# Patient Record
Sex: FEMALE | Race: WHITE | Employment: FULL TIME | URBAN - METROPOLITAN AREA
[De-identification: names, ages, dates, MRNs, and addresses within clinical notes are randomized per-mention and may not be internally consistent; named-entity substitution may affect disease eponyms.]

---

## 2017-01-27 ENCOUNTER — OFFICE VISIT (OUTPATIENT)
Dept: INTERNAL MEDICINE CLINIC | Facility: CLINIC | Age: 47
End: 2017-01-27

## 2017-01-27 VITALS
RESPIRATION RATE: 16 BRPM | HEIGHT: 61.5 IN | HEART RATE: 78 BPM | SYSTOLIC BLOOD PRESSURE: 122 MMHG | WEIGHT: 141 LBS | BODY MASS INDEX: 26.28 KG/M2 | DIASTOLIC BLOOD PRESSURE: 78 MMHG

## 2017-01-27 DIAGNOSIS — F43.9 STRESS: ICD-10-CM

## 2017-01-27 DIAGNOSIS — E66.9 OBESITY (BMI 30-39.9): ICD-10-CM

## 2017-01-27 DIAGNOSIS — Z51.81 ENCOUNTER FOR THERAPEUTIC DRUG MONITORING: Primary | ICD-10-CM

## 2017-01-27 PROCEDURE — 99213 OFFICE O/P EST LOW 20 MIN: CPT | Performed by: INTERNAL MEDICINE

## 2017-01-27 RX ORDER — PHENTERMINE HYDROCHLORIDE 37.5 MG/1
37.5 TABLET ORAL
Qty: 30 TABLET | Refills: 0 | Status: SHIPPED | OUTPATIENT
Start: 2017-01-27 | End: 2017-02-28

## 2017-01-27 NOTE — PROGRESS NOTES
CC: Patient presents with:  Weight Check: same weight       HPI:   Obesity, doing well on phentermine and contrave. No chest pain.        Current Outpatient Prescriptions:  Phentermine HCl 37.5 MG Oral Tab Take 1 tablet (37.5 mg total) by mouth every 2 tabs bid.      The patient indicates understanding of these issues and agrees to the plan. Return in about 4 weeks (around 2/24/2017).

## 2017-02-28 ENCOUNTER — OFFICE VISIT (OUTPATIENT)
Dept: INTERNAL MEDICINE CLINIC | Facility: CLINIC | Age: 47
End: 2017-02-28

## 2017-02-28 VITALS
WEIGHT: 143 LBS | HEIGHT: 61.5 IN | HEART RATE: 74 BPM | SYSTOLIC BLOOD PRESSURE: 118 MMHG | DIASTOLIC BLOOD PRESSURE: 76 MMHG | RESPIRATION RATE: 16 BRPM | BODY MASS INDEX: 26.65 KG/M2

## 2017-02-28 DIAGNOSIS — Z51.81 ENCOUNTER FOR THERAPEUTIC DRUG MONITORING: Primary | ICD-10-CM

## 2017-02-28 DIAGNOSIS — F43.9 STRESS: ICD-10-CM

## 2017-02-28 DIAGNOSIS — E66.9 OBESITY (BMI 30-39.9): ICD-10-CM

## 2017-02-28 PROCEDURE — 99213 OFFICE O/P EST LOW 20 MIN: CPT | Performed by: INTERNAL MEDICINE

## 2017-02-28 RX ORDER — PHENTERMINE HYDROCHLORIDE 37.5 MG/1
37.5 TABLET ORAL
Qty: 30 TABLET | Refills: 0 | Status: SHIPPED | OUTPATIENT
Start: 2017-02-28 | End: 2017-06-23

## 2017-02-28 NOTE — PROGRESS NOTES
CC: Patient presents with:  Weight Check: up 2 lb       HPI:   Obesity, doing well on phentermine and contrave. Only takes contrave once daily, no chest pain.        Current Outpatient Prescriptions:  Phentermine HCl 37.5 MG Oral Tab Take 1 tablet (37.5 m weeks (around 3/28/2017).

## 2017-03-23 ENCOUNTER — OFFICE VISIT (OUTPATIENT)
Dept: INTERNAL MEDICINE CLINIC | Facility: CLINIC | Age: 47
End: 2017-03-23

## 2017-03-23 VITALS
DIASTOLIC BLOOD PRESSURE: 68 MMHG | HEART RATE: 80 BPM | RESPIRATION RATE: 16 BRPM | WEIGHT: 142 LBS | HEIGHT: 61.5 IN | BODY MASS INDEX: 26.47 KG/M2 | SYSTOLIC BLOOD PRESSURE: 118 MMHG

## 2017-03-23 DIAGNOSIS — F43.9 STRESS: ICD-10-CM

## 2017-03-23 DIAGNOSIS — Z51.81 ENCOUNTER FOR THERAPEUTIC DRUG MONITORING: Primary | ICD-10-CM

## 2017-03-23 DIAGNOSIS — E66.9 OBESITY (BMI 30-39.9): ICD-10-CM

## 2017-03-23 PROCEDURE — 99213 OFFICE O/P EST LOW 20 MIN: CPT | Performed by: INTERNAL MEDICINE

## 2017-03-23 RX ORDER — PHENTERMINE HYDROCHLORIDE 37.5 MG/1
37.5 TABLET ORAL
Qty: 30 TABLET | Refills: 0 | Status: CANCELLED | OUTPATIENT
Start: 2017-03-23

## 2017-03-23 RX ORDER — PHENTERMINE HYDROCHLORIDE 15 MG/1
15 CAPSULE ORAL EVERY MORNING
Qty: 30 CAPSULE | Refills: 2 | Status: SHIPPED | OUTPATIENT
Start: 2017-03-23 | End: 2017-08-15

## 2017-03-23 NOTE — PROGRESS NOTES
CC: Patient presents with:  Weight Check: down 1 lb       HPI:   Obesity, doing well on phentermine and contrave. Still with increased stress.        Current Outpatient Prescriptions:  Phentermine HCl 15 MG Oral Cap Take 1 capsule (15 mg total) by alivia agrees to the plan. Return in about 3 months (around 6/23/2017).

## 2017-04-19 ENCOUNTER — TELEPHONE (OUTPATIENT)
Dept: INTERNAL MEDICINE CLINIC | Facility: CLINIC | Age: 47
End: 2017-04-19

## 2017-05-02 ENCOUNTER — TELEPHONE (OUTPATIENT)
Dept: INTERNAL MEDICINE CLINIC | Facility: CLINIC | Age: 47
End: 2017-05-02

## 2017-05-02 NOTE — TELEPHONE ENCOUNTER
WLC: med changes not made outside of an office visit. Also above message does not state name of medication patient is requesting alternative for so we would not be able to give alternatives.

## 2017-05-02 NOTE — TELEPHONE ENCOUNTER
Spoke with patient and advised her that if [facility] is not wanting to use her coupon card for Contrave she would need to go to a different pharmacy that would use the card or a prior authorization would need be done. Patient verbalized understanding.

## 2017-05-16 ENCOUNTER — TELEPHONE (OUTPATIENT)
Dept: INTERNAL MEDICINE CLINIC | Facility: CLINIC | Age: 47
End: 2017-05-16

## 2017-05-16 NOTE — TELEPHONE ENCOUNTER
Patient stopped VSL because of listed complaints, I explained I had not heard of her complications. She went back on probiotic she use to take and is doing well. I told patient to continue what she is tolerating and keep her f/u with MD to discuss.   Tyra

## 2017-06-23 ENCOUNTER — OFFICE VISIT (OUTPATIENT)
Dept: INTERNAL MEDICINE CLINIC | Facility: CLINIC | Age: 47
End: 2017-06-23

## 2017-06-23 VITALS
DIASTOLIC BLOOD PRESSURE: 76 MMHG | SYSTOLIC BLOOD PRESSURE: 122 MMHG | HEART RATE: 70 BPM | HEIGHT: 61.5 IN | RESPIRATION RATE: 16 BRPM | BODY MASS INDEX: 27.21 KG/M2 | WEIGHT: 146 LBS

## 2017-06-23 DIAGNOSIS — Z51.81 ENCOUNTER FOR THERAPEUTIC DRUG MONITORING: Primary | ICD-10-CM

## 2017-06-23 DIAGNOSIS — E66.9 OBESITY (BMI 30-39.9): ICD-10-CM

## 2017-06-23 DIAGNOSIS — F43.9 STRESS: ICD-10-CM

## 2017-06-23 PROCEDURE — 99213 OFFICE O/P EST LOW 20 MIN: CPT | Performed by: INTERNAL MEDICINE

## 2017-06-23 RX ORDER — PHENTERMINE HYDROCHLORIDE 37.5 MG/1
37.5 TABLET ORAL
Qty: 30 TABLET | Refills: 0 | Status: SHIPPED | OUTPATIENT
Start: 2017-06-23 | End: 2017-08-15

## 2017-06-23 NOTE — PROGRESS NOTES
CC: Patient presents with:  Weight Check: up 4 lb       HPI:   Obesity, doing well on phentermine and contrave. Stress level is ok, f/u with psych therapy.        Current Outpatient Prescriptions:  Phentermine HCl 37.5 MG Oral Tab Take 1 tablet (37.5 Stopped belviq due to headaches. Saw dietician. Stress, doing well with psych therapy.  Will increase phentermine to 37.5mg and refill monthly for 3 months, cont contrave.   Will run labs.        The patient indicates understanding of these issues and agre

## 2017-07-24 NOTE — ADDENDUM NOTE
Encounter addended by: Merleen Denver, MA on: 7/24/2017  4:49 PM<BR>    Actions taken: Letter status changed

## 2017-07-26 ENCOUNTER — TELEPHONE (OUTPATIENT)
Dept: INTERNAL MEDICINE CLINIC | Facility: CLINIC | Age: 47
End: 2017-07-26

## 2017-07-26 NOTE — TELEPHONE ENCOUNTER
Requesting Phentermine   LOV: 6/23/17  RTC: 4 weeks  Last Labs: n/a   Filled: 6/23/17 #30 with 0 refills    Future Appointments  Date Time Provider Sheri Du   8/10/2017 4:45 PM Sherman Ernst MD [address]   8/25/2017 2:15 PM Sherman Ernst

## 2017-07-26 NOTE — TELEPHONE ENCOUNTER
Name of Medication:  Phentermine HCl 37.5 MG Oral Tab 30 tablet 0 6/23/2017    Sig :  Take 1 tablet (37.5 mg total) by mouth every morning before breakfast.     Route:   Oral     Order #:   [id number]           Dose:     How is medication prescribed:

## 2017-07-29 LAB
BUN: 16 MG/DL (ref 7–25)
CALCIUM: 9.9 MG/DL (ref 8.6–10.2)
CARBON DIOXIDE: 30 MMOL/L (ref 20–31)
CHLORIDE: 103 MMOL/L (ref 98–110)
CREATININE: 0.87 MG/DL (ref 0.5–1.1)
EGFR IF AFRICN AM: 93 ML/MIN/1.73M2
EGFR IF NONAFRICN AM: 80 ML/MIN/1.73M2
GLUCOSE: 88 MG/DL (ref 65–99)
POTASSIUM: 4.4 MMOL/L (ref 3.5–5.3)
SODIUM: 142 MMOL/L (ref 135–146)
TSH: 1.69 MIU/L
VITAMIN B12: 912 PG/ML (ref 200–1100)
VITAMIN D, 25-OH, TOTAL: 72 NG/ML (ref 30–100)

## 2017-08-15 ENCOUNTER — LAB ENCOUNTER (OUTPATIENT)
Dept: LAB | Age: 47
End: 2017-08-15
Attending: INTERNAL MEDICINE
Payer: COMMERCIAL

## 2017-08-15 ENCOUNTER — OFFICE VISIT (OUTPATIENT)
Dept: INTERNAL MEDICINE CLINIC | Facility: CLINIC | Age: 47
End: 2017-08-15

## 2017-08-15 VITALS
HEART RATE: 80 BPM | HEIGHT: 61.5 IN | RESPIRATION RATE: 16 BRPM | BODY MASS INDEX: 27.4 KG/M2 | DIASTOLIC BLOOD PRESSURE: 76 MMHG | WEIGHT: 147 LBS | SYSTOLIC BLOOD PRESSURE: 122 MMHG

## 2017-08-15 DIAGNOSIS — E78.00 PURE HYPERCHOLESTEROLEMIA: Primary | ICD-10-CM

## 2017-08-15 DIAGNOSIS — Z51.81 ENCOUNTER FOR THERAPEUTIC DRUG MONITORING: Primary | ICD-10-CM

## 2017-08-15 DIAGNOSIS — R79.89 OTHER SPECIFIED ABNORMAL FINDINGS OF BLOOD CHEMISTRY: ICD-10-CM

## 2017-08-15 DIAGNOSIS — E66.9 OBESITY (BMI 30-39.9): ICD-10-CM

## 2017-08-15 DIAGNOSIS — E78.2 MIXED HYPERLIPIDEMIA: ICD-10-CM

## 2017-08-15 PROBLEM — E78.5 HYPERLIPIDEMIA: Status: ACTIVE | Noted: 2017-08-15

## 2017-08-15 PROCEDURE — 99213 OFFICE O/P EST LOW 20 MIN: CPT | Performed by: INTERNAL MEDICINE

## 2017-08-15 RX ORDER — PHENTERMINE HYDROCHLORIDE 37.5 MG/1
37.5 TABLET ORAL
Qty: 30 TABLET | Refills: 0 | Status: SHIPPED | OUTPATIENT
Start: 2017-08-15 | End: 2017-09-07

## 2017-08-15 NOTE — PROGRESS NOTES
CC: Patient presents with:  Weight Check: up 1 lb       HPI:   Obesity, doing well on phentermine and contrave. No chest pain. Had labs done by pcp.        Current Outpatient Prescriptions:  Phentermine HCl 37.5 MG Oral Tab Take 1 tablet (37.5 mg tot these issues and agrees to the plan. Return in about 4 weeks (around 9/12/2017).

## 2017-09-07 ENCOUNTER — OFFICE VISIT (OUTPATIENT)
Dept: INTERNAL MEDICINE CLINIC | Facility: CLINIC | Age: 47
End: 2017-09-07

## 2017-09-07 VITALS
DIASTOLIC BLOOD PRESSURE: 76 MMHG | RESPIRATION RATE: 16 BRPM | HEIGHT: 61.5 IN | HEART RATE: 78 BPM | WEIGHT: 147 LBS | BODY MASS INDEX: 27.4 KG/M2 | SYSTOLIC BLOOD PRESSURE: 122 MMHG

## 2017-09-07 DIAGNOSIS — E78.2 MIXED HYPERLIPIDEMIA: ICD-10-CM

## 2017-09-07 DIAGNOSIS — Z51.81 ENCOUNTER FOR THERAPEUTIC DRUG MONITORING: Primary | ICD-10-CM

## 2017-09-07 DIAGNOSIS — E66.9 OBESITY (BMI 30-39.9): ICD-10-CM

## 2017-09-07 PROCEDURE — 99213 OFFICE O/P EST LOW 20 MIN: CPT | Performed by: INTERNAL MEDICINE

## 2017-09-07 RX ORDER — BUPROPION HYDROCHLORIDE 150 MG/1
300 TABLET ORAL DAILY
Qty: 60 TABLET | Refills: 5 | Status: SHIPPED | OUTPATIENT
Start: 2017-09-07 | End: 2017-10-30

## 2017-09-07 RX ORDER — PHENTERMINE HYDROCHLORIDE 37.5 MG/1
37.5 TABLET ORAL
Qty: 30 TABLET | Refills: 0 | Status: SHIPPED | OUTPATIENT
Start: 2017-09-07 | End: 2017-09-27

## 2017-09-07 NOTE — PROGRESS NOTES
CC: Patient presents with:  Weight Check: same       HPI:   Obesity, doing well on phentermine and contrave. Can't afford contrave as too expensive.        Current Outpatient Prescriptions:  Phentermine HCl 37.5 MG Oral Tab Take 1 tablet (37.5 mg tot XR.  Will cont phentermine 37.5mg and do monthly for 3 months. 2. High lipids, look better, good apoB, low inflammation, TG's better, cont diet modifications. The patient indicates understanding of these issues and agrees to the plan.   Return in ab

## 2017-09-27 ENCOUNTER — OFFICE VISIT (OUTPATIENT)
Dept: INTERNAL MEDICINE CLINIC | Facility: CLINIC | Age: 47
End: 2017-09-27

## 2017-09-27 VITALS
HEART RATE: 60 BPM | SYSTOLIC BLOOD PRESSURE: 100 MMHG | RESPIRATION RATE: 16 BRPM | WEIGHT: 151 LBS | DIASTOLIC BLOOD PRESSURE: 68 MMHG | HEIGHT: 61.5 IN | BODY MASS INDEX: 28.14 KG/M2

## 2017-09-27 DIAGNOSIS — Z51.81 ENCOUNTER FOR THERAPEUTIC DRUG MONITORING: Primary | ICD-10-CM

## 2017-09-27 DIAGNOSIS — E66.9 OBESITY (BMI 30-39.9): ICD-10-CM

## 2017-09-27 DIAGNOSIS — F43.9 STRESS: ICD-10-CM

## 2017-09-27 PROCEDURE — 99214 OFFICE O/P EST MOD 30 MIN: CPT | Performed by: NURSE PRACTITIONER

## 2017-09-27 RX ORDER — BUPROPION HYDROCHLORIDE 150 MG/1
300 TABLET ORAL DAILY
Qty: 60 TABLET | Refills: 5 | Status: CANCELLED | OUTPATIENT
Start: 2017-09-27

## 2017-09-27 RX ORDER — PHENTERMINE HYDROCHLORIDE 37.5 MG/1
37.5 TABLET ORAL
Qty: 30 TABLET | Refills: 0 | Status: SHIPPED | OUTPATIENT
Start: 2017-09-27 | End: 2017-10-30

## 2017-09-27 NOTE — PATIENT INSTRUCTIONS
Continue making lifestyle changes that focus on good nutrition, regular exercise and stress management. Medication Plan: Continue with phentermine at current dose, increase Wellbutrin XL to 300 mg daily.     Agreed upon goal/s before next office visit in from the internal organs and to your large muscles to prepare for “fight or flight.” However, once the effects of adrenaline wear off, cortisol, known as the “stress hormone,” hangs around and starts signaling the body to replenish your food supply.  Jessie Marcial may burn off some extra calories fidgeting or running around cleaning because we can’t sit still, anxiety can also trigger “emotional eating.” Overeating or eating unhealthy foods in response to stress or as a way to calm down is a very common response.  In laboratory mice, that being “stressed” by exposure to the smell of a predator lead the mice to eat more high-fat food pellets, when given the choice of eating these instead of normal feed.   Less Sleep  Do you ever lie awake at night worrying about paying t also learn to tune into your subjective feelings of hunger or fullness, rather than eating just because it’s a mealtime or because there is food in front of you.  A well-designed study of binge-eaters showed that participating in a Mindful Eating program le

## 2017-09-27 NOTE — PROGRESS NOTES
Azael Phipps is a 40year old female presents today for f/u follow-up on medical weight loss program for the treatment of overweight, obesity, or morbid obesity with associated low vitamin D. Previous patient of Dr. Avery Crocker and [facility] client since 8/2015. no pedal edema.   GI: rotund, soft, +BS, no masses, HSM or tenderness  NEURO/MS: motor and sensory grossly intact  PSYCH: pleasant, cooperative, normal mood and affect    ASSESSMENT AND PLAN:  Encounter for therapeutic drug monitoring  (primary encounter di a busy mom, eating cookies in your car as you shuttle the kids back and forth to a slew of activities. 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  If you recognize yourself in by learning to store fat supplies for the long haul. The unfortunate result for you and me is that when we are chronically stressed by life crises and work-life demands, we are prone to getting an extra layer of “visceral fat” deep in our bellies.  Your bel you eat mindlessly, you will likely eat more, yet feel less satisfied.   Cravings and Fast Food  When we are chronically stressed, we crave “comfort foods,” such as a bag of potato chips or a tub of ice cream. These foods tend to be easy to eat, highly proc Sleep is also a powerful factor influencing weight gain or loss. Lack of sleep may disrupt the functioning of ghrelin and leptin—chemicals that control appetite. We also crave carbs when we are tired or grumpy from lack of sleep.  Finally, not getting our p important goals keeps your hands busy and your mind occupied, so you’re less likely to snack on unhealthy foods.  Writing can give you insight into why you’re feeling so stressed and highlight ways of thinking or expectations of yourself that may be increas

## 2017-10-30 ENCOUNTER — OFFICE VISIT (OUTPATIENT)
Dept: INTERNAL MEDICINE CLINIC | Facility: CLINIC | Age: 47
End: 2017-10-30

## 2017-10-30 VITALS
DIASTOLIC BLOOD PRESSURE: 70 MMHG | HEIGHT: 61.5 IN | SYSTOLIC BLOOD PRESSURE: 100 MMHG | HEART RATE: 60 BPM | BODY MASS INDEX: 28.33 KG/M2 | RESPIRATION RATE: 16 BRPM | WEIGHT: 152 LBS

## 2017-10-30 DIAGNOSIS — Z51.81 ENCOUNTER FOR THERAPEUTIC DRUG MONITORING: Primary | ICD-10-CM

## 2017-10-30 DIAGNOSIS — E66.9 OBESITY (BMI 30-39.9): ICD-10-CM

## 2017-10-30 DIAGNOSIS — F43.9 STRESS: ICD-10-CM

## 2017-10-30 PROCEDURE — 99213 OFFICE O/P EST LOW 20 MIN: CPT | Performed by: NURSE PRACTITIONER

## 2017-10-30 RX ORDER — PHENTERMINE HYDROCHLORIDE 37.5 MG/1
37.5 TABLET ORAL
Qty: 30 TABLET | Refills: 0 | Status: SHIPPED | OUTPATIENT
Start: 2017-10-30 | End: 2018-03-20 | Stop reason: ALTCHOICE

## 2017-10-30 RX ORDER — BUPROPION HYDROCHLORIDE 150 MG/1
150 TABLET ORAL DAILY
Qty: 30 TABLET | Refills: 5 | COMMUNITY
Start: 2017-10-30 | End: 2018-03-20

## 2017-10-30 RX ORDER — METFORMIN HYDROCHLORIDE 500 MG/1
500 TABLET, EXTENDED RELEASE ORAL
Qty: 30 TABLET | Refills: 1 | Status: SHIPPED | OUTPATIENT
Start: 2017-10-30 | End: 2017-11-29

## 2017-10-30 RX ORDER — ATOMOXETINE 60 MG/1
60 CAPSULE ORAL DAILY
Qty: 30 CAPSULE | Refills: 0 | COMMUNITY
Start: 2017-10-30 | End: 2017-11-29

## 2017-10-30 NOTE — PATIENT INSTRUCTIONS
Continue making lifestyle changes that focus on good nutrition, regular exercise and stress management. Medication Plan: Continue with phentermine at current dose. Reduce Wellbutrin XL to 150 mg daily. Add Metformin  mg daily with food.     Agreed week.  · Walk for 5 minutes each time. Second week  · Walk 3 times a week. · Walk for 10 minutes each time. Third week  · Walk 3 times a week. · Walk for 13 minutes each time. Fourth week  · Walk 3 times a week. · Walk for 15 minutes each time.   [facility]

## 2017-10-30 NOTE — PROGRESS NOTES
Noa Pinon is a 40year old female presents today for  follow-up on medical weight loss program for the treatment of overweight, obesity, or morbid obesity.     S:  Current weight Wt Readings from Last 6 Encounters:  10/30/17 : 152 lb  09/27/17 : 151 Prescriptions Disp Refills    Phentermine HCl 37.5 MG Oral Tab 30 tablet 0      Sig: Take 1 tablet (37.5 mg total) by mouth every morning before breakfast.      MetFORMIN HCl  MG Oral Tablet 24 Hr 30 tablet 1      Sig: Take 1 tablet (500 mg total) by can stick with. Choose activities you like. Go slowly, especially when just starting out. Work up to being active 30 minutes on most days. Aim for a total of 150 or more minutes a week. Why be fit?   People who are physically fit:  · Are more alert and pro provider  if you have heart disease, high blood pressure, diabetes, arthritis, asthma, or any other health problem that concerns you. Your provider can help you get started and help you stay safe.    Date Last Reviewed: 8/13/2015  © 5199-8188 [employer]

## 2017-11-29 ENCOUNTER — OFFICE VISIT (OUTPATIENT)
Dept: INTERNAL MEDICINE CLINIC | Facility: CLINIC | Age: 47
End: 2017-11-29

## 2017-11-29 VITALS
SYSTOLIC BLOOD PRESSURE: 90 MMHG | HEART RATE: 68 BPM | RESPIRATION RATE: 16 BRPM | DIASTOLIC BLOOD PRESSURE: 60 MMHG | BODY MASS INDEX: 28.52 KG/M2 | HEIGHT: 61.5 IN | WEIGHT: 153 LBS

## 2017-11-29 DIAGNOSIS — E66.9 OBESITY (BMI 30-39.9): ICD-10-CM

## 2017-11-29 DIAGNOSIS — Z51.81 ENCOUNTER FOR THERAPEUTIC DRUG MONITORING: Primary | ICD-10-CM

## 2017-11-29 PROCEDURE — 99213 OFFICE O/P EST LOW 20 MIN: CPT | Performed by: NURSE PRACTITIONER

## 2017-11-29 RX ORDER — METFORMIN HYDROCHLORIDE 500 MG/1
1000 TABLET, EXTENDED RELEASE ORAL
Qty: 60 TABLET | Refills: 1 | Status: SHIPPED | OUTPATIENT
Start: 2017-11-29 | End: 2018-01-23

## 2017-11-29 RX ORDER — PHENTERMINE HYDROCHLORIDE 37.5 MG/1
37.5 TABLET ORAL
Qty: 30 TABLET | Refills: 0 | Status: CANCELLED | OUTPATIENT
Start: 2017-11-29

## 2017-11-29 RX ORDER — FUROSEMIDE 20 MG/1
20 TABLET ORAL DAILY
COMMUNITY
Start: 2017-11-08 | End: 2019-04-15

## 2017-11-29 RX ORDER — PHENTERMINE HYDROCHLORIDE 15 MG/1
15 CAPSULE ORAL EVERY MORNING
Qty: 30 CAPSULE | Refills: 1 | Status: SHIPPED | OUTPATIENT
Start: 2017-11-29 | End: 2018-01-23

## 2017-11-29 NOTE — PATIENT INSTRUCTIONS
Continue making lifestyle changes that focus on good nutrition, regular exercise and stress management. Medication Plan: Reduce phentermine to 15 mg daily for maintenance. Continue Wellbutrin XL at current dose. Increase Metformin ER to 1000 mg daily.

## 2017-11-29 NOTE — PROGRESS NOTES
Jose Cruz Portillo is a 40year old female presents today for  follow-up on medical weight loss program for the treatment of overweight, obesity, or morbid obesity.     S:  Current weight Wt Readings from Last 6 Encounters:  11/29/17 : 153 lb  10/30/17 : 152 normal S1 and S2 without clicks or gallops, no pedal edema.   GI: rotund, soft, +BS, no masses, HSM or tenderness  NEURO/MS: motor and sensory grossly intact  PSYCH: pleasant, cooperative, normal mood and affect    ASSESSMENT AND PLAN:  Encounter for therap patient. Return in about 2 months (around 1/29/2018) for weight mangement. Patient verbalizes understanding.

## 2017-12-08 ENCOUNTER — HOSPITAL ENCOUNTER (OUTPATIENT)
Age: 47
Discharge: HOME | End: 2017-12-08
Attending: EMERGENCY MEDICINE
Payer: COMMERCIAL

## 2017-12-08 ENCOUNTER — APPOINTMENT (OUTPATIENT)
Dept: GENERAL RADIOLOGY | Age: 47
End: 2017-12-08
Attending: EMERGENCY MEDICINE
Payer: COMMERCIAL

## 2017-12-08 VITALS
RESPIRATION RATE: 18 BRPM | TEMPERATURE: 98 F | OXYGEN SATURATION: 98 % | DIASTOLIC BLOOD PRESSURE: 80 MMHG | SYSTOLIC BLOOD PRESSURE: 116 MMHG | HEART RATE: 94 BPM

## 2017-12-08 DIAGNOSIS — B97.89 VIRAL SINUSITIS: ICD-10-CM

## 2017-12-08 DIAGNOSIS — B34.9 VIRAL SYNDROME: Primary | ICD-10-CM

## 2017-12-08 DIAGNOSIS — J32.9 VIRAL SINUSITIS: ICD-10-CM

## 2017-12-08 PROCEDURE — 71020 XR CHEST PA + LAT CHEST (CPT=71020): CPT | Performed by: EMERGENCY MEDICINE

## 2017-12-08 PROCEDURE — 99204 OFFICE O/P NEW MOD 45 MIN: CPT

## 2017-12-08 PROCEDURE — 99213 OFFICE O/P EST LOW 20 MIN: CPT

## 2017-12-08 RX ORDER — PREDNISONE 20 MG/1
40 TABLET ORAL DAILY
Qty: 10 TABLET | Refills: 0 | Status: SHIPPED | OUTPATIENT
Start: 2017-12-08 | End: 2017-12-13

## 2017-12-09 NOTE — ED INITIAL ASSESSMENT (HPI)
Started with a stomach virus on Sunday. Patient has been having a fever every day since Sunday and complains of sinus pain and pressure today. Draining and coughing that is keeping patient up at night. Productive cough.

## 2017-12-09 NOTE — ED PROVIDER NOTES
Patient presents with:  Sinus Problem    HPI:     Radha Dow is a 40year old female who presents with chief complaint of n/v/d, fever, sinus pressure, cough. Started with n/v/d and fever 6 days ago.   States all 8 in her household had similar stomac CHEST PA + LAT CHEST (CPT=71020)  INDICATIONS:  SINUS PAIN  COMPARISON:  None. TECHNIQUE:  PA and lateral chest radiographs were obtained. PATIENT STATED HISTORY: (As transcribed by Technologist)  Productive cough and fever for five days.     FINDINGS:  N

## 2018-02-20 PROCEDURE — 82607 VITAMIN B-12: CPT | Performed by: INTERNAL MEDICINE

## 2018-09-07 PROCEDURE — 86800 THYROGLOBULIN ANTIBODY: CPT | Performed by: NURSE PRACTITIONER

## 2018-09-07 PROCEDURE — 86376 MICROSOMAL ANTIBODY EACH: CPT | Performed by: NURSE PRACTITIONER

## 2018-09-07 PROCEDURE — 84482 T3 REVERSE: CPT | Performed by: NURSE PRACTITIONER

## 2018-12-29 PROCEDURE — 82533 TOTAL CORTISOL: CPT | Performed by: INTERNAL MEDICINE

## 2018-12-29 PROCEDURE — 36415 COLL VENOUS BLD VENIPUNCTURE: CPT | Performed by: INTERNAL MEDICINE

## 2018-12-29 PROCEDURE — 80299 QUANTITATIVE ASSAY DRUG: CPT | Performed by: INTERNAL MEDICINE

## 2019-04-15 ENCOUNTER — OFFICE VISIT (OUTPATIENT)
Dept: FAMILY MEDICINE CLINIC | Facility: CLINIC | Age: 49
End: 2019-04-15
Payer: COMMERCIAL

## 2019-04-15 VITALS
HEART RATE: 68 BPM | TEMPERATURE: 98 F | DIASTOLIC BLOOD PRESSURE: 72 MMHG | RESPIRATION RATE: 18 BRPM | WEIGHT: 182 LBS | BODY MASS INDEX: 33.49 KG/M2 | HEIGHT: 62 IN | SYSTOLIC BLOOD PRESSURE: 118 MMHG

## 2019-04-15 DIAGNOSIS — E66.9 CLASS 1 OBESITY WITHOUT SERIOUS COMORBIDITY WITH BODY MASS INDEX (BMI) OF 30.0 TO 30.9 IN ADULT, UNSPECIFIED OBESITY TYPE: Primary | ICD-10-CM

## 2019-04-15 DIAGNOSIS — R53.82 CHRONIC FATIGUE: ICD-10-CM

## 2019-04-15 DIAGNOSIS — E78.1 HYPERTRIGLYCERIDEMIA: ICD-10-CM

## 2019-04-15 DIAGNOSIS — E55.9 VITAMIN D DEFICIENCY: ICD-10-CM

## 2019-04-15 DIAGNOSIS — M25.50 ARTHRALGIA, UNSPECIFIED JOINT: ICD-10-CM

## 2019-04-15 PROBLEM — R14.1 FLATULENCE, ERUCTATION AND GAS PAIN: Status: ACTIVE | Noted: 2019-04-15

## 2019-04-15 PROBLEM — R14.3 FLATULENCE, ERUCTATION AND GAS PAIN: Status: ACTIVE | Noted: 2019-04-15

## 2019-04-15 PROBLEM — S06.0X0A CONCUSSION WITHOUT LOSS OF CONSCIOUSNESS: Status: ACTIVE | Noted: 2017-06-16

## 2019-04-15 PROBLEM — R14.2 FLATULENCE, ERUCTATION AND GAS PAIN: Status: ACTIVE | Noted: 2019-04-15

## 2019-04-15 PROCEDURE — 99215 OFFICE O/P EST HI 40 MIN: CPT | Performed by: PHYSICIAN ASSISTANT

## 2019-04-15 NOTE — PROGRESS NOTES
Anibal Hopkins is a 53year old female. Patient presents with:  Weight Loss Gain (metabolic): joint pain      HPI:     Hx of hypothyroid and many food allergies.      Lost 60 lbs a few years ago after eliminating food allergens and cleaning up her diet HENT: Negative for congestion, ear pain, sinus pain and sore throat. Eyes: Negative for blurred vision, discharge and redness. Respiratory: Negative for cough, shortness of breath and wheezing.     Cardiovascular: Negative for chest pain and palpitatio Alcohol/week: 0.6 oz        Types: 1 Shots of liquor per week      Drug use: No      Sexual activity: Not on file    Lifestyle      Physical activity:        Days per week: Not on file        Minutes per session: Not on file      Stress: Not on file Cardiovascular: Normal rate, regular rhythm and normal heart sounds. No murmur heard. Pulmonary/Chest: Effort normal and breath sounds normal.   Abdominal: Soft. Bowel sounds are normal. There is no tenderness. There is no guarding.    Lymphadenopathy: - ASSAY, THYROID STIM HORMONE  - FREE T3 (TRIIODOTHYRONINE)  - FREE T4 (FREE THYROXINE)  - THYROID PEROXIDASE (TPO) AB  - CANDIDA IGG/A/M AB PANEL;  Future  - DEHYDROEPIANDROSTERONE SULFATE  - PREGNENOLONE BY MS/MS, SERUM; Future  - THYROID ANTITHYROGLOBULI Orders Placed This Visit:  Orders Placed This Encounter      CHERYL, Direct Screen [E]      CBC W Differential W Platelet [E]      Hemoglobin A1C (Glycohemoglobin) [E]      Insulin [E]      Lipoprotein (A) [E]      Lipid Panel [E]      Vitamin D, 25-Hydroxy [ PLEASE NOTE: As this is a lab test done by an outside company, these results do not pull into your [facility] lab results online.  A copy of the results typically will be given to you when you follow up to discuss the results in the office unless otherwise spe Restore  A liquid supplement for gut health. This is a carbon, soil-based product that helps to rebuild the tight junctions, or important connections between cells, in the intestines.  These tight junctions get compromised by daily stress, reduced immune fu

## 2019-04-15 NOTE — PATIENT INSTRUCTIONS
[facility] Inflammation Test for Food Sensitivities  This is a Food Sensitivity Test that measures sensitivities to up to 132 different foods, coloring and additives.  The Food Inflammation Test, also known as the FIT Test, was created by Jemima Silva Take 1 capsule or 5000 IU daily. Can find at fruitful Yield or Whole Foods. [place] 3 fatty acids are anti-inflammatory and important for brain and nervous system health, including memory. I recommend taking 2000 mg daily.   Some good brands are:

## 2019-05-10 ENCOUNTER — APPOINTMENT (OUTPATIENT)
Dept: LAB | Facility: REFERENCE LAB | Age: 49
End: 2019-05-10
Attending: PHYSICIAN ASSISTANT
Payer: COMMERCIAL

## 2019-05-10 DIAGNOSIS — E78.1 HYPERTRIGLYCERIDEMIA: ICD-10-CM

## 2019-05-10 DIAGNOSIS — R53.82 CHRONIC FATIGUE: ICD-10-CM

## 2019-05-10 DIAGNOSIS — M25.50 ARTHRALGIA, UNSPECIFIED JOINT: ICD-10-CM

## 2019-05-10 DIAGNOSIS — E66.9 CLASS 1 OBESITY WITHOUT SERIOUS COMORBIDITY WITH BODY MASS INDEX (BMI) OF 30.0 TO 30.9 IN ADULT, UNSPECIFIED OBESITY TYPE: ICD-10-CM

## 2019-05-10 DIAGNOSIS — E55.9 VITAMIN D DEFICIENCY: ICD-10-CM

## 2019-05-10 PROCEDURE — 83090 ASSAY OF HOMOCYSTEINE: CPT | Performed by: PHYSICIAN ASSISTANT

## 2019-05-10 PROCEDURE — 84443 ASSAY THYROID STIM HORMONE: CPT | Performed by: PHYSICIAN ASSISTANT

## 2019-05-10 PROCEDURE — 84439 ASSAY OF FREE THYROXINE: CPT | Performed by: PHYSICIAN ASSISTANT

## 2019-05-10 PROCEDURE — 83036 HEMOGLOBIN GLYCOSYLATED A1C: CPT | Performed by: PHYSICIAN ASSISTANT

## 2019-05-10 PROCEDURE — 86800 THYROGLOBULIN ANTIBODY: CPT | Performed by: PHYSICIAN ASSISTANT

## 2019-05-10 PROCEDURE — 84140 ASSAY OF PREGNENOLONE: CPT | Performed by: PHYSICIAN ASSISTANT

## 2019-05-10 PROCEDURE — 86376 MICROSOMAL ANTIBODY EACH: CPT | Performed by: PHYSICIAN ASSISTANT

## 2019-05-10 PROCEDURE — 86628 CANDIDA ANTIBODY: CPT | Performed by: PHYSICIAN ASSISTANT

## 2019-05-10 PROCEDURE — 83695 ASSAY OF LIPOPROTEIN(A): CPT | Performed by: PHYSICIAN ASSISTANT

## 2019-05-10 PROCEDURE — 36415 COLL VENOUS BLD VENIPUNCTURE: CPT | Performed by: PHYSICIAN ASSISTANT

## 2019-05-10 PROCEDURE — 82627 DEHYDROEPIANDROSTERONE: CPT | Performed by: PHYSICIAN ASSISTANT

## 2019-05-10 PROCEDURE — 86038 ANTINUCLEAR ANTIBODIES: CPT | Performed by: PHYSICIAN ASSISTANT

## 2019-05-10 PROCEDURE — 84481 FREE ASSAY (FT-3): CPT | Performed by: PHYSICIAN ASSISTANT

## 2019-05-16 ENCOUNTER — TELEPHONE (OUTPATIENT)
Dept: FAMILY MEDICINE CLINIC | Facility: CLINIC | Age: 49
End: 2019-05-16

## 2019-05-16 NOTE — PROGRESS NOTES
Tato Beatty,    Your results finally returned. I am still waiting on your food sensitivity testing. The Candida antibody levels were found to be elevated in your system.    Candida is a yeast, or a type of microorganism, that is found in the whole mix of b feel like you are being imprisoned by any diet or lifestyle that you follow. In order to improve this I recommend the following:      Caprylic Acid  Description: NOW® Caprylic Acid is a naturally derived nutrient also known as octanoic acid.  Caprylic www.Eagle [email or web address].Muzeek, www.Fanplayr. Muzeek; Whole Foods or Fruitful Yield  Why: to support hormonal health and balance in the system. Lipoprotein A is elevated. This is a small sticky cholesterol particle that is genetically inherited.  It puts you at higher

## 2019-05-17 ENCOUNTER — TELEPHONE (OUTPATIENT)
Dept: FAMILY MEDICINE CLINIC | Facility: CLINIC | Age: 49
End: 2019-05-17

## 2019-05-17 NOTE — TELEPHONE ENCOUNTER
Called pt to come in to redraw blood for micronutrient testing w no charge . There was a problem with the blood we sent out.

## 2019-07-29 ENCOUNTER — OFFICE VISIT (OUTPATIENT)
Dept: INTEGRATIVE MEDICINE | Facility: CLINIC | Age: 49
End: 2019-07-29
Payer: COMMERCIAL

## 2019-07-29 DIAGNOSIS — Z71.3 NUTRITIONAL COUNSELING: ICD-10-CM

## 2019-07-29 DIAGNOSIS — R63.4 WEIGHT LOSS: ICD-10-CM

## 2019-07-29 PROCEDURE — 97802 MEDICAL NUTRITION INDIV IN: CPT | Performed by: NUTRITIONIST

## 2019-07-29 NOTE — PROGRESS NOTES
Self referred   Did patient complete Nutritional Therapy Questionnaire? NO  Completed KBMO testing and [employer]  Presents w/     Jose Cruz Portillo is a 53year old female presenting for Medical Nutrition Therapy in regards to weight loss.   Getting f consider supporting overall gut health w/ L-glutamine and high quality probiotics. Weight gain can also be associated w/ stress and hormone imbalance. Sleep is also not idea which could be affecting cortisol levels and affecting insulin sensitivity.   Exp

## 2019-08-12 ENCOUNTER — APPOINTMENT (OUTPATIENT)
Dept: GENERAL RADIOLOGY | Age: 49
End: 2019-08-12
Attending: FAMILY MEDICINE
Payer: COMMERCIAL

## 2019-08-12 ENCOUNTER — HOSPITAL ENCOUNTER (OUTPATIENT)
Age: 49
Discharge: HOME | End: 2019-08-12
Attending: FAMILY MEDICINE
Payer: COMMERCIAL

## 2019-08-12 VITALS
HEART RATE: 64 BPM | RESPIRATION RATE: 18 BRPM | HEIGHT: 62 IN | WEIGHT: 193 LBS | OXYGEN SATURATION: 99 % | DIASTOLIC BLOOD PRESSURE: 64 MMHG | BODY MASS INDEX: 35.51 KG/M2 | SYSTOLIC BLOOD PRESSURE: 116 MMHG | TEMPERATURE: 97 F

## 2019-08-12 DIAGNOSIS — R07.81 RIB PAIN ON RIGHT SIDE: Primary | ICD-10-CM

## 2019-08-12 PROCEDURE — 99213 OFFICE O/P EST LOW 20 MIN: CPT

## 2019-08-12 PROCEDURE — 71101 X-RAY EXAM UNILAT RIBS/CHEST: CPT | Performed by: FAMILY MEDICINE

## 2019-08-12 RX ORDER — METAXALONE 800 MG/1
800 TABLET ORAL 3 TIMES DAILY
Qty: 21 TABLET | Refills: 0 | Status: SHIPPED | OUTPATIENT
Start: 2019-08-12 | End: 2019-08-19

## 2019-08-12 NOTE — ED PROVIDER NOTES
Patient Seen in: [address] [facility]    History   Patient presents with:  Trauma (cardiovascular, musculoskeletal)    Stated Complaint: rib pain    HPI    **78-year-old female presents to the immediate care today with a chief complaints src Temporal   SpO2 99 %   O2 Device None (Room air)       Current:/64   Pulse 64   Temp 97.3 °F (36.3 °C) (Temporal)   Resp 18   Ht 157.5 cm (5' 2\")   Wt 87.5 kg   SpO2 99%   BMI 35.30 kg/m²         Physical Exam    General appearance: alert, appea (As transcribed by Technologist)  Patient states she developed sudden pain to right ribs just below her breast after reaching up to take down balloons decorations 4 days ago. States it is painful with movement and palpation.      FINDINGS:   Frontal view ch

## 2019-08-12 NOTE — ED INITIAL ASSESSMENT (HPI)
Pt states after reaching up Thursday sudden pain in right ribs, states under her breast. States she had something similar last year. States it is pain ful with movement and palpation.

## 2019-08-19 ENCOUNTER — OFFICE VISIT (OUTPATIENT)
Dept: INTEGRATIVE MEDICINE | Facility: CLINIC | Age: 49
End: 2019-08-19
Payer: COMMERCIAL

## 2019-08-19 DIAGNOSIS — Z71.3 NUTRITIONAL COUNSELING: ICD-10-CM

## 2019-08-19 PROCEDURE — 97803 MED NUTRITION INDIV SUBSEQ: CPT | Performed by: NUTRITIONIST

## 2019-08-19 NOTE — PATIENT INSTRUCTIONS
The products and items listed below (the “Products”)  and their claims have not been evaluated by the Food and Drug Administration. Dietary products are not intended to treat, prevent, mitigate or cure disease.  Ultimately, you must draw your own conclusi ? Oranges     ? Cantaloupe  ? Dried Dates  ? Pineapple  ? Watermelon  ?  Over ripe bananas     ___________  If our next visit, there is no continued weight loss, we will consider supplementing with L-glutamine and Probiotic - See handout for more informatio

## 2019-08-19 NOTE — PROGRESS NOTES
Self Referred  Did patient complete Nutritional Therapy Questionnaire? NO  Completed KBMO testing and [employer] Me    Terry Graham is a 53year old female presenting for medical nutrition therapy in regards to weight loss.   Since last visit on 7/29/19, pa patient: 30 minutes    Dietary Supplements:   Probiomax Lean by NIMISHA Kaiser  8/19/2019  3:02 PM

## 2019-09-16 ENCOUNTER — OFFICE VISIT (OUTPATIENT)
Dept: INTEGRATIVE MEDICINE | Facility: CLINIC | Age: 49
End: 2019-09-16
Payer: COMMERCIAL

## 2019-09-16 DIAGNOSIS — Z71.3 NUTRITIONAL COUNSELING: ICD-10-CM

## 2019-09-16 PROCEDURE — 97803 MED NUTRITION INDIV SUBSEQ: CPT | Performed by: NUTRITIONIST

## 2019-09-16 NOTE — PROGRESS NOTES
Self Referred  Did patient complete Nutritional Therapy Questionnaire? NO  Completed KBMO testing and 23 [employer] Me    Marina Mead is a 53year old female presenting for medical nutrition therapy in regards to weight loss.   Since last visit on 8/21/19, pa

## 2019-10-11 ENCOUNTER — TELEPHONE (OUTPATIENT)
Dept: INTEGRATIVE MEDICINE | Facility: CLINIC | Age: 49
End: 2019-10-11

## 2019-10-30 ENCOUNTER — OFFICE VISIT (OUTPATIENT)
Dept: SURGERY | Facility: CLINIC | Age: 49
End: 2019-10-30
Payer: COMMERCIAL

## 2019-10-30 VITALS
HEART RATE: 89 BPM | OXYGEN SATURATION: 98 % | HEIGHT: 61.5 IN | BODY MASS INDEX: 35.04 KG/M2 | WEIGHT: 188 LBS | DIASTOLIC BLOOD PRESSURE: 68 MMHG | SYSTOLIC BLOOD PRESSURE: 100 MMHG

## 2019-10-30 DIAGNOSIS — E66.9 OBESITY (BMI 30-39.9): ICD-10-CM

## 2019-10-30 DIAGNOSIS — E55.9 VITAMIN D DEFICIENCY: ICD-10-CM

## 2019-10-30 DIAGNOSIS — E78.1 HYPERTRIGLYCERIDEMIA: ICD-10-CM

## 2019-10-30 DIAGNOSIS — K58.2 IRRITABLE BOWEL SYNDROME WITH BOTH CONSTIPATION AND DIARRHEA: ICD-10-CM

## 2019-10-30 DIAGNOSIS — R53.82 CHRONIC FATIGUE: ICD-10-CM

## 2019-10-30 DIAGNOSIS — Z51.81 ENCOUNTER FOR THERAPEUTIC DRUG MONITORING: Primary | ICD-10-CM

## 2019-10-30 DIAGNOSIS — F41.9 ANXIETY: ICD-10-CM

## 2019-10-30 DIAGNOSIS — F43.9 STRESS: ICD-10-CM

## 2019-10-30 PROCEDURE — 99215 OFFICE O/P EST HI 40 MIN: CPT | Performed by: NURSE PRACTITIONER

## 2019-10-30 RX ORDER — FUROSEMIDE 20 MG/1
TABLET ORAL
COMMUNITY
Start: 2019-10-27

## 2019-10-30 NOTE — PATIENT INSTRUCTIONS
Values: To have a healthy heart, long life, grand kids, spouse, self    Stop melatonin, trial magnesium instead. Listen to [address] for hormone balance.      Mary Anne's peppermint capsules on Amazon    Aim for 25 g of sugar/d

## 2019-11-14 ENCOUNTER — TELEPHONE (OUTPATIENT)
Dept: SURGERY | Facility: CLINIC | Age: 49
End: 2019-11-14

## 2019-11-14 NOTE — TELEPHONE ENCOUNTER
VM left informing patient of administration instructions for medication. Encouraged patient to call back with any further questions.      Thank you,  LUI Herrera

## 2019-11-14 NOTE — TELEPHONE ENCOUNTER
Called patient to inform her that Caterina Rushing was approved by insurance. she would like a call just so she could discuss specifically how you would want for her to take medication.

## 2019-12-12 ENCOUNTER — OFFICE VISIT (OUTPATIENT)
Dept: SURGERY | Facility: CLINIC | Age: 49
End: 2019-12-12
Payer: COMMERCIAL

## 2019-12-12 VITALS
WEIGHT: 182.81 LBS | DIASTOLIC BLOOD PRESSURE: 66 MMHG | BODY MASS INDEX: 34.07 KG/M2 | SYSTOLIC BLOOD PRESSURE: 104 MMHG | HEIGHT: 61.5 IN

## 2019-12-12 DIAGNOSIS — F41.9 ANXIETY: ICD-10-CM

## 2019-12-12 DIAGNOSIS — F43.9 STRESS: ICD-10-CM

## 2019-12-12 DIAGNOSIS — E66.9 OBESITY (BMI 30-39.9): ICD-10-CM

## 2019-12-12 DIAGNOSIS — R53.82 CHRONIC FATIGUE: ICD-10-CM

## 2019-12-12 DIAGNOSIS — E55.9 VITAMIN D DEFICIENCY: ICD-10-CM

## 2019-12-12 DIAGNOSIS — K58.2 IRRITABLE BOWEL SYNDROME WITH BOTH CONSTIPATION AND DIARRHEA: ICD-10-CM

## 2019-12-12 DIAGNOSIS — Z51.81 ENCOUNTER FOR THERAPEUTIC DRUG MONITORING: Primary | ICD-10-CM

## 2019-12-12 DIAGNOSIS — E78.1 HYPERTRIGLYCERIDEMIA: ICD-10-CM

## 2019-12-12 PROCEDURE — 99214 OFFICE O/P EST MOD 30 MIN: CPT | Performed by: NURSE PRACTITIONER

## 2019-12-12 NOTE — PROGRESS NOTES
[address], [address], [address], [address] [place] [address]  Dept: [phone]       Patient:  Kori Lynn  :      1970  MRN:      [id number]    Chief Complaint:  Patient p long life, grand kids, spouse, self     Mom passed from heart disease      Past Medical History: History reviewed. No pertinent past medical history.      Comorbidities:  Back pain-Improvement?  no and FRANCISCO-Improvement?  yes    OBJECTIVE     Vitals: /6 History      Not on file    Social Needs      Financial resource strain: Not on file      Food insecurity:        Worry: Not on file        Inability: Not on file      Transportation needs:        Medical: Not on file        Non-medical: Not on file    Tob Rare    Soda Drinker: No       Or very rare      Sports Drinks:  Yes low sugar Powerade  Juice:  No     Food Journal  · Reviewed and Discussed:       · Patient has a Food Journal?: yes Tracking (work program)   · Patient is reading nutrition labels?   yes positive for headaches and  Hx concussion   Behavioral/Psych: positive for anxiety and stress  Endocrine: negative  All other systems were reviewed and are negative    Physical Exam:   General appearance: alert, appears stated age, cooperative and obese Take 2 tablets by mouth 2 (two) times daily. HYPERTRIGLYCERIDEMIA: Elevated. Recommend dietary changes and lifestyle modifications as discussed below. Monitor.      Lab Results   Component Value Date/Time    CHOLEST 176 09/07/2018 11:28 AM    LDL 92 09    Consider Vyvanse or phentermine in the future for additional support     Has taken phentermine, metformin and topiramate in the past- not useful       She has taken saxenda, did not tolerate medication well with significant diarrhea, possible dehydrat

## 2019-12-12 NOTE — PATIENT INSTRUCTIONS
Stretch nightly (10 minutes); Treadmill during a funny show    Values: To have a healthy heart, long life, grand kids, spouse, self    Stop melatonin, trial magnesium instead.      Listen to [employer] Podcast.     Mary Anne's peppermint capsules on A

## 2019-12-19 ENCOUNTER — TELEPHONE (OUTPATIENT)
Dept: INTEGRATIVE MEDICINE | Facility: CLINIC | Age: 49
End: 2019-12-19

## 2020-01-16 ENCOUNTER — OFFICE VISIT (OUTPATIENT)
Dept: SURGERY | Facility: CLINIC | Age: 50
End: 2020-01-16
Payer: COMMERCIAL

## 2020-01-16 VITALS
WEIGHT: 183 LBS | OXYGEN SATURATION: 96 % | HEART RATE: 79 BPM | DIASTOLIC BLOOD PRESSURE: 66 MMHG | BODY MASS INDEX: 34.11 KG/M2 | SYSTOLIC BLOOD PRESSURE: 108 MMHG | HEIGHT: 61.5 IN

## 2020-01-16 DIAGNOSIS — E78.1 HYPERTRIGLYCERIDEMIA: ICD-10-CM

## 2020-01-16 DIAGNOSIS — E66.9 OBESITY (BMI 30-39.9): ICD-10-CM

## 2020-01-16 DIAGNOSIS — R53.82 CHRONIC FATIGUE: ICD-10-CM

## 2020-01-16 DIAGNOSIS — K58.2 IRRITABLE BOWEL SYNDROME WITH BOTH CONSTIPATION AND DIARRHEA: ICD-10-CM

## 2020-01-16 DIAGNOSIS — F43.9 STRESS: ICD-10-CM

## 2020-01-16 DIAGNOSIS — F41.9 ANXIETY: ICD-10-CM

## 2020-01-16 DIAGNOSIS — E55.9 VITAMIN D DEFICIENCY: ICD-10-CM

## 2020-01-16 DIAGNOSIS — Z51.81 ENCOUNTER FOR THERAPEUTIC DRUG MONITORING: Primary | ICD-10-CM

## 2020-01-16 PROCEDURE — 99214 OFFICE O/P EST MOD 30 MIN: CPT | Performed by: NURSE PRACTITIONER

## 2020-01-16 NOTE — PATIENT INSTRUCTIONS
Whole 30. Stretch nightly (10 minutes); Treadmill during a funny show    Values: To have a healthy heart, long life, grand kids, spouse, self    Stop melatonin, trial magnesium instead.      Listen to [place]'s peppermint ca

## 2020-01-16 NOTE — PROGRESS NOTES
[address], [address], [address], [address] [place] [address]  Dept: [phone]       Patient:  Juan Keenan  :      1970  MRN:      [id number]    Chief Complaint:  Patient p screening: Negative     Barriers: Food allergies, stress      Values: To have a healthy heart, long life, grand kids, spouse, self     Mom passed from heart disease      Past Medical History: History reviewed. No pertinent past medical history.      [place] Worry: Not on file        Inability: Not on file      Transportation needs:        Medical: Not on file        Non-medical: Not on file    Tobacco Use      Smoking status: Never Smoker      Smokeless tobacco: Never Used    Substance and Sexual Activity Journal  · Reviewed and Discussed:       · Patient has a Food Journal?: yes Tracking (work program)   · Patient is reading nutrition labels? yes  · Average Caloric Intake: 800-1200    · Average CHO Intake: < 100   · Is patient exercising?  yes  · Type of e Hx concussion   Behavioral/Psych: positive for anxiety and stress  Endocrine: negative  All other systems were reviewed and are negative    Physical Exam:   General appearance: alert, appears stated age, cooperative and obese   Head: Normocephalic, withou 11:28 AM    HDL 50 09/07/2018 11:28 AM    TRIG 170 (H) 09/07/2018 11:28 AM       BLE Edema: Continue Furosemide daily, consider spironolactone      OBESITY/WEIGHT GAIN:     Discussed starting weight:  188 lbs; BMI 34.95; WC: 35 in  Patient's goal weight: 1 migraine headache, hx concussion      Continue low candida dietary pattern, trial Whole 30 by next visit       Continue MVI, Probiotic, Xymogen L-Glutamine, fish oil; Consider Femquil     Encouraged continue work health program      Continue magnesium

## 2020-03-11 ENCOUNTER — OFFICE VISIT (OUTPATIENT)
Dept: SURGERY | Facility: CLINIC | Age: 50
End: 2020-03-11
Payer: COMMERCIAL

## 2020-03-11 VITALS
HEIGHT: 61.5 IN | DIASTOLIC BLOOD PRESSURE: 62 MMHG | WEIGHT: 188.63 LBS | SYSTOLIC BLOOD PRESSURE: 114 MMHG | OXYGEN SATURATION: 97 % | BODY MASS INDEX: 35.16 KG/M2 | HEART RATE: 83 BPM

## 2020-03-11 DIAGNOSIS — E66.9 OBESITY (BMI 30-39.9): ICD-10-CM

## 2020-03-11 DIAGNOSIS — Z51.81 ENCOUNTER FOR THERAPEUTIC DRUG MONITORING: Primary | ICD-10-CM

## 2020-03-11 DIAGNOSIS — R53.82 CHRONIC FATIGUE: ICD-10-CM

## 2020-03-11 DIAGNOSIS — E55.9 VITAMIN D DEFICIENCY: ICD-10-CM

## 2020-03-11 DIAGNOSIS — F41.9 ANXIETY: ICD-10-CM

## 2020-03-11 DIAGNOSIS — K58.2 IRRITABLE BOWEL SYNDROME WITH BOTH CONSTIPATION AND DIARRHEA: ICD-10-CM

## 2020-03-11 DIAGNOSIS — E78.1 HYPERTRIGLYCERIDEMIA: ICD-10-CM

## 2020-03-11 DIAGNOSIS — F43.9 STRESS: ICD-10-CM

## 2020-03-11 PROCEDURE — 99214 OFFICE O/P EST MOD 30 MIN: CPT | Performed by: NURSE PRACTITIONER

## 2020-03-11 NOTE — PATIENT INSTRUCTIONS
Aim for 40 grams of fiber a day-   1 cup of cooked lentils is 15 grams of fiber   2 tbsp of flaxseed ground is 4 grams of fiber  1/2 cup cooked oatmeal is 4 grams of fiber; add in 1/2 cup raspberries for an additional 4 grams of fiber    Bring in vegetab

## 2020-03-11 NOTE — PROGRESS NOTES
[address], Efrain R[address], [address], [address] [place] [address]  Dept: [phone]       Patient:  America Cuellar  :      1970  MRN:      [id number]    Chief Complaint:  Patient p    Sleep screening: Negative     Barriers: Food allergies, stress      Values: To have a healthy heart, long life, grand kids, spouse, self     Mom passed from heart disease      Past Medical History: History reviewed. No pertinent past medical history. file      Number of children: Not on file      Years of education: Not on file      Highest education level: Not on file    Occupational History      Not on file    Social Needs      Financial resource strain: Not on file      Food insecurity:        Worry eating: -  Portion sizes: -  Binge: BED 7 -  Emotional: +  Depression: PHQ total score: 7  Grazing: -  Sweet tooth: -  Crunchy/salty: +  Etoh: Rare    Soda Drinker: No       Or very rare      Sports Drinks:  Yes low sugar Powerade  Juice:  No     Food Jour diarrhea and Hx IBS   Genitourinary:negative, s/p hysterectomy  Integument/breast: positive for dry skin, occasional eczema hands; hx psoriasis   Hematologic/lymphatic: negative  Musculoskeletal:positive for back pain  Neurological: positive for headaches Dimesylate (VYVANSE) 20 MG Oral Cap; Take 1 capsule (20 mg total) by mouth daily. -     VITAMIN D, 25-HYDROXY  -     VITAMIN B12  -     COMP METABOLIC PANEL (14)  -     Naltrexone-buPROPion HCl ER (CONTRAVE) 8-90 MG Oral Tablet 12 Hr;  Take 2 tablets by mo intensive lifestyle and behavioral modifications at this time for weight loss.     Educated patient on lifestyle modifications: Mediterranean/Whole Food/Plant Strong/Low Glycemic Index diet, moderate alcohol consumption, reduced sodium intake to no more th program      Continue magnesium      Trial ember's peppermint capsules for GI upset      Start counseling with Dr. Kae Hightower follow up with Quique Michael [place] and SUJEY Wolfe as recommended    IF, 10 hours maximum     Continue Hello Fresh    Goal:  I

## 2020-03-12 ENCOUNTER — TELEPHONE (OUTPATIENT)
Dept: SURGERY | Facility: CLINIC | Age: 50
End: 2020-03-12

## 2020-10-06 ENCOUNTER — TELEPHONE (OUTPATIENT)
Dept: INTERNAL MEDICINE CLINIC | Facility: HOSPITAL | Age: 50
End: 2020-10-06

## 2020-10-06 DIAGNOSIS — Z20.822 SUSPECTED COVID-19 VIRUS INFECTION: Primary | ICD-10-CM

## 2020-10-07 ENCOUNTER — LAB ENCOUNTER (OUTPATIENT)
Dept: LAB | Age: 50
End: 2020-10-07
Attending: PREVENTIVE MEDICINE

## 2020-10-07 DIAGNOSIS — Z20.822 SUSPECTED COVID-19 VIRUS INFECTION: ICD-10-CM

## 2020-10-28 DIAGNOSIS — E66.9 OBESITY (BMI 30-39.9): ICD-10-CM

## 2020-10-28 DIAGNOSIS — Z51.81 ENCOUNTER FOR THERAPEUTIC DRUG MONITORING: ICD-10-CM

## 2020-10-28 RX ORDER — NALTREXONE HYDROCHLORIDE AND BUPROPION HYDROCHLORIDE 8; 90 MG/1; MG/1
TABLET, EXTENDED RELEASE ORAL
Qty: 120 TABLET | Refills: 3 | OUTPATIENT
Start: 2020-10-28

## 2020-11-19 ENCOUNTER — TELEMEDICINE (OUTPATIENT)
Dept: SURGERY | Facility: CLINIC | Age: 50
End: 2020-11-19

## 2020-11-19 VITALS — BODY MASS INDEX: 36 KG/M2 | WEIGHT: 191 LBS

## 2020-11-19 DIAGNOSIS — E66.9 OBESITY (BMI 30-39.9): ICD-10-CM

## 2020-11-19 DIAGNOSIS — Z51.81 ENCOUNTER FOR THERAPEUTIC DRUG MONITORING: Primary | ICD-10-CM

## 2020-11-19 PROCEDURE — 99214 OFFICE O/P EST MOD 30 MIN: CPT | Performed by: NURSE PRACTITIONER

## 2020-11-19 RX ORDER — PHENTERMINE HYDROCHLORIDE 37.5 MG/1
37.5 CAPSULE ORAL EVERY MORNING
Qty: 30 CAPSULE | Refills: 2 | Status: SHIPPED | OUTPATIENT
Start: 2020-11-19 | End: 2021-02-22

## 2020-11-19 NOTE — PROGRESS NOTES
Virtual Video/Telephone Check-In    Shayla Diamond verbally consents to a Virtual/Telephone Check-In visit on 11/19/20. Patient has been referred to the [facility] website at [email or web address] to review the yearly Consent to Treat document.     Patient of 1 to 10, patient's belief that she can lose weight is a 10.     Patient's goal weight: 130 lb  Biggest weight loss in the past: 60 lb  How weight loss was achieved:   Heaviest weight ever: 196 lbs  Previous use of medical weight loss medications: Charles Social History:  Social History    Socioeconomic History      Marital status:       Spouse name: Not on file      Number of children: Not on file      Years of education: Not on file      Highest education level: Not on file    Occupational Hist Intake:  After dinner behavior: peanut butter, homemade guacamole, pork rinds, snap peas, corn chips  Night eating: -  Portion sizes: -  Binge: BED 7 -  Emotional: +  Depression: PHQ total score: 7  Grazing: -  Sweet tooth: -  Crunchy/salty: +  Etoh: Rare Genitourinary:negative, s/p hysterectomy  Integument/breast: positive for dry skin, occasional eczema hands; hx psoriasis   Hematologic/lymphatic: negative  Musculoskeletal:positive for back pain  Neurological: positive for headaches and  Hx concussion GAIN:     Discussed starting weight:  188 lbs; BMI 34.95; WC: 35 in  Patient's goal weight: 130 lbs  Values:  To have a healthy heart, long life, grand kids, spouse, self     Recommended patient continue intensive lifestyle and behavioral modifications at t low candida dietary pattern; Continue [employer].     Continue MVI, Calcium/Vitamin D, Probiotic, Xymogen L-Glutamine, Fish oil, Magnesium.       Start counseling with Dr. Lane Mayberry.     Continue follow up with Lucho Verduzco [place] for nutrition counseling as recommended.

## 2020-11-19 NOTE — PATIENT INSTRUCTIONS
Continue [employer]. Restart phentermine at the full dose. Plan to restart Contrave at follow up in 2 months.

## 2021-02-19 DIAGNOSIS — E66.9 OBESITY (BMI 30-39.9): ICD-10-CM

## 2021-02-19 DIAGNOSIS — Z51.81 ENCOUNTER FOR THERAPEUTIC DRUG MONITORING: ICD-10-CM

## 2021-02-22 RX ORDER — PHENTERMINE HYDROCHLORIDE 37.5 MG/1
37.5 CAPSULE ORAL EVERY MORNING
Qty: 15 CAPSULE | Refills: 0 | Status: SHIPPED | OUTPATIENT
Start: 2021-02-22 | End: 2021-03-03

## 2021-02-23 NOTE — TELEPHONE ENCOUNTER
Please schedule patient for f/u appointment at this time. I am unable to refill medication further without an updated weight and vital signs. Thank you.

## 2021-03-03 ENCOUNTER — OFFICE VISIT (OUTPATIENT)
Dept: SURGERY | Facility: CLINIC | Age: 51
End: 2021-03-03
Payer: COMMERCIAL

## 2021-03-03 VITALS
WEIGHT: 182.38 LBS | DIASTOLIC BLOOD PRESSURE: 76 MMHG | BODY MASS INDEX: 33.99 KG/M2 | SYSTOLIC BLOOD PRESSURE: 114 MMHG | HEIGHT: 61.5 IN

## 2021-03-03 DIAGNOSIS — E66.9 OBESITY (BMI 30-39.9): ICD-10-CM

## 2021-03-03 DIAGNOSIS — Z51.81 ENCOUNTER FOR THERAPEUTIC DRUG MONITORING: Primary | ICD-10-CM

## 2021-03-03 DIAGNOSIS — E55.9 VITAMIN D DEFICIENCY: ICD-10-CM

## 2021-03-03 DIAGNOSIS — E78.1 HYPERTRIGLYCERIDEMIA: ICD-10-CM

## 2021-03-03 DIAGNOSIS — K58.2 IRRITABLE BOWEL SYNDROME WITH BOTH CONSTIPATION AND DIARRHEA: ICD-10-CM

## 2021-03-03 DIAGNOSIS — R53.82 CHRONIC FATIGUE: ICD-10-CM

## 2021-03-03 DIAGNOSIS — F43.9 STRESS: ICD-10-CM

## 2021-03-03 DIAGNOSIS — F41.9 ANXIETY: ICD-10-CM

## 2021-03-03 PROCEDURE — 99214 OFFICE O/P EST MOD 30 MIN: CPT | Performed by: NURSE PRACTITIONER

## 2021-03-03 PROCEDURE — 3074F SYST BP LT 130 MM HG: CPT | Performed by: NURSE PRACTITIONER

## 2021-03-03 PROCEDURE — 3008F BODY MASS INDEX DOCD: CPT | Performed by: NURSE PRACTITIONER

## 2021-03-03 PROCEDURE — 3078F DIAST BP <80 MM HG: CPT | Performed by: NURSE PRACTITIONER

## 2021-03-03 RX ORDER — PHENTERMINE HYDROCHLORIDE 37.5 MG/1
37.5 CAPSULE ORAL EVERY MORNING
Qty: 30 CAPSULE | Refills: 2 | Status: SHIPPED | OUTPATIENT
Start: 2021-03-03 | End: 2021-04-07

## 2021-03-03 RX ORDER — NALTREXONE HYDROCHLORIDE AND BUPROPION HYDROCHLORIDE 8; 90 MG/1; MG/1
TABLET, EXTENDED RELEASE ORAL
Qty: 120 TABLET | Refills: 0 | Status: SHIPPED | OUTPATIENT
Start: 2021-03-03 | End: 2021-12-08

## 2021-03-03 NOTE — PROGRESS NOTES
Patient:  Adenike Gamez  :      1970  MRN:      [id number]    Chief Complaint:  Weight Management     SUBJECTIVE     History of Present Illness:  Louis  is being seen today for a follow-up for non surgical weight loss.        Continues o Ht 5' 1.5\" (1.562 m)   Wt 182 lb 6.4 oz (82.7 kg)   BMI 33.91 kg/m²     Initial weight loss: -09   Total weight loss: -06   Start weight: 188    Wt Readings from Last 6 Encounters:  03/03/21 : 182 lb 6.4 oz (82.7 kg)  11/19/20 : 191 lb (86.6 kg)  03/11/ Alcohol/week: 1.0 standard drinks        Types: 1 Shots of liquor per week      Drug use: No      Sexual activity: Not on file    Lifestyle      Physical activity        Days per week: Not on file        Minutes per session: Not on file      Stress: [facility] virtual work outs    [address]  · Patient states the following:  · Eats 3 meal(s) per day  · Length of time it takes to consume a meal:    · # of snacks per day: 2   · Type of snacks:  Nuts, fruit  · Amount of soda consu supple, symmetrical, trachea midline and thyroid not enlarged, symmetric, no tenderness/mass/nodules  Lungs: clear to auscultation bilaterally  Heart: S1, S2 normal, no murmur, click, rub or gallop, regular rate and rhythm  Abdomen: soft, non-tender; bowel total) by mouth every morning.  -     EKG 12-LEAD;  Future  -     Naltrexone-buPROPion HCl ER (CONTRAVE) 8-90 MG Oral Tablet 12 Hr; Week 1: 1 tablet in AM. Week 2: 1 tablet in AM and PM. Week 3: 2 tablets in AM, 1 tablet in PM. Week 4: [place] 2 tablets in A 11:28 AM    LDL 92 09/07/2018 11:28 AM    HDL 50 09/07/2018 11:28 AM    TRIG 170 (H) 09/07/2018 11:28 AM       BLE Edema: Continue Furosemide daily, consider spironolactone.      OBESITY/WEIGHT GAIN:     Discussed starting weight:  188 lbs; BMI 34.95; WC: possible dehydration.     Belviq caused severe migraine headache, hx concussion, off market. Vyvanse- not covered.      Needs EKG or faxed copy of EKG to office.      Continue low candida dietary pattern; Continue WW.     Continue MVI, Calcium/Vitamin D,

## 2021-03-26 ENCOUNTER — LAB ENCOUNTER (OUTPATIENT)
Dept: LAB | Age: 51
End: 2021-03-26
Attending: NURSE PRACTITIONER
Payer: COMMERCIAL

## 2021-03-26 DIAGNOSIS — Z51.81 ENCOUNTER FOR THERAPEUTIC DRUG MONITORING: ICD-10-CM

## 2021-03-26 DIAGNOSIS — K58.2 IRRITABLE BOWEL SYNDROME WITH BOTH CONSTIPATION AND DIARRHEA: ICD-10-CM

## 2021-03-26 DIAGNOSIS — F41.9 ANXIETY: ICD-10-CM

## 2021-03-26 DIAGNOSIS — E78.1 HYPERTRIGLYCERIDEMIA: ICD-10-CM

## 2021-03-26 DIAGNOSIS — R53.82 CHRONIC FATIGUE: ICD-10-CM

## 2021-03-26 DIAGNOSIS — E55.9 VITAMIN D DEFICIENCY: ICD-10-CM

## 2021-03-26 DIAGNOSIS — F43.9 STRESS: ICD-10-CM

## 2021-03-26 DIAGNOSIS — E66.9 OBESITY (BMI 30-39.9): ICD-10-CM

## 2021-03-26 LAB
ALBUMIN SERPL-MCNC: 3.8 G/DL (ref 3.4–5)
ALBUMIN/GLOB SERPL: 1.2 {RATIO} (ref 1–2)
ALP LIVER SERPL-CCNC: 99 U/L
ALT SERPL-CCNC: 64 U/L
ANION GAP SERPL CALC-SCNC: 3 MMOL/L (ref 0–18)
AST SERPL-CCNC: 41 U/L (ref 15–37)
BILIRUB SERPL-MCNC: 0.4 MG/DL (ref 0.1–2)
BUN BLD-MCNC: 13 MG/DL (ref 7–18)
BUN/CREAT SERPL: 17.3 (ref 10–20)
CALCIUM BLD-MCNC: 9.3 MG/DL (ref 8.5–10.1)
CHLORIDE SERPL-SCNC: 111 MMOL/L (ref 98–112)
CHOLEST SMN-MCNC: 167 MG/DL (ref ?–200)
CO2 SERPL-SCNC: 28 MMOL/L (ref 21–32)
CREAT BLD-MCNC: 0.75 MG/DL
CRP SERPL-MCNC: 0.34 MG/DL (ref ?–0.3)
EST. AVERAGE GLUCOSE BLD GHB EST-MCNC: 117 MG/DL (ref 68–126)
GLOBULIN PLAS-MCNC: 3.3 G/DL (ref 2.8–4.4)
GLUCOSE BLD-MCNC: 96 MG/DL (ref 70–99)
HBA1C MFR BLD HPLC: 5.7 % (ref ?–5.7)
HDLC SERPL-MCNC: 45 MG/DL (ref 40–59)
LDLC SERPL CALC-MCNC: 93 MG/DL (ref ?–100)
M PROTEIN MFR SERPL ELPH: 7.1 G/DL (ref 6.4–8.2)
NONHDLC SERPL-MCNC: 122 MG/DL (ref ?–130)
OSMOLALITY SERPL CALC.SUM OF ELEC: 294 MOSM/KG (ref 275–295)
PATIENT FASTING Y/N/NP: YES
PATIENT FASTING Y/N/NP: YES
POTASSIUM SERPL-SCNC: 4.3 MMOL/L (ref 3.5–5.1)
SODIUM SERPL-SCNC: 142 MMOL/L (ref 136–145)
T4 FREE SERPL-MCNC: 1 NG/DL (ref 0.8–1.7)
TRIGL SERPL-MCNC: 146 MG/DL (ref 30–149)
TSI SER-ACNC: 1.38 MIU/ML (ref 0.36–3.74)
VIT B12 SERPL-MCNC: 806 PG/ML (ref 193–986)
VIT D+METAB SERPL-MCNC: 74.5 NG/ML (ref 30–100)
VLDLC SERPL CALC-MCNC: 29 MG/DL (ref 0–30)

## 2021-03-26 PROCEDURE — 36415 COLL VENOUS BLD VENIPUNCTURE: CPT | Performed by: NURSE PRACTITIONER

## 2021-03-26 PROCEDURE — 83036 HEMOGLOBIN GLYCOSYLATED A1C: CPT | Performed by: NURSE PRACTITIONER

## 2021-03-26 PROCEDURE — 82306 VITAMIN D 25 HYDROXY: CPT | Performed by: NURSE PRACTITIONER

## 2021-03-26 PROCEDURE — 84443 ASSAY THYROID STIM HORMONE: CPT | Performed by: NURSE PRACTITIONER

## 2021-03-26 PROCEDURE — 84439 ASSAY OF FREE THYROXINE: CPT | Performed by: NURSE PRACTITIONER

## 2021-03-26 PROCEDURE — 86140 C-REACTIVE PROTEIN: CPT | Performed by: NURSE PRACTITIONER

## 2021-03-26 PROCEDURE — 82607 VITAMIN B-12: CPT | Performed by: NURSE PRACTITIONER

## 2021-03-26 PROCEDURE — 80053 COMPREHEN METABOLIC PANEL: CPT | Performed by: NURSE PRACTITIONER

## 2021-03-26 PROCEDURE — 80061 LIPID PANEL: CPT | Performed by: NURSE PRACTITIONER

## 2021-04-07 ENCOUNTER — TELEMEDICINE (OUTPATIENT)
Dept: SURGERY | Facility: CLINIC | Age: 51
End: 2021-04-07

## 2021-04-07 VITALS — BODY MASS INDEX: 33 KG/M2 | WEIGHT: 176.13 LBS

## 2021-04-07 DIAGNOSIS — E55.9 VITAMIN D DEFICIENCY: ICD-10-CM

## 2021-04-07 DIAGNOSIS — Z51.81 ENCOUNTER FOR THERAPEUTIC DRUG MONITORING: Primary | ICD-10-CM

## 2021-04-07 DIAGNOSIS — R79.89 ELEVATED LFTS: ICD-10-CM

## 2021-04-07 DIAGNOSIS — F41.9 ANXIETY: ICD-10-CM

## 2021-04-07 DIAGNOSIS — E66.9 OBESITY (BMI 30-39.9): ICD-10-CM

## 2021-04-07 DIAGNOSIS — K58.2 IRRITABLE BOWEL SYNDROME WITH BOTH CONSTIPATION AND DIARRHEA: ICD-10-CM

## 2021-04-07 DIAGNOSIS — R73.03 PREDIABETES: ICD-10-CM

## 2021-04-07 DIAGNOSIS — R53.82 CHRONIC FATIGUE: ICD-10-CM

## 2021-04-07 DIAGNOSIS — E78.1 HYPERTRIGLYCERIDEMIA: ICD-10-CM

## 2021-04-07 DIAGNOSIS — F43.9 STRESS: ICD-10-CM

## 2021-04-07 PROCEDURE — 99214 OFFICE O/P EST MOD 30 MIN: CPT | Performed by: NURSE PRACTITIONER

## 2021-04-07 RX ORDER — PHENTERMINE HYDROCHLORIDE 37.5 MG/1
37.5 CAPSULE ORAL EVERY MORNING
Qty: 30 CAPSULE | Refills: 2 | Status: SHIPPED | OUTPATIENT
Start: 2021-04-07 | End: 2021-07-14

## 2021-04-07 NOTE — PROGRESS NOTES
Virtual Video & Audio Check-In    Sarahi Lynn verbally consents to a [employer] Check-In visit on 04/07/21. Patient has been referred to the [facility] website at [email or web address] to review the yearly Consent to Treat document.     Mami 10.     Patient's goal weight: 130 lb  Biggest weight loss in the past: 60 lb  How weight loss was achieved:   Heaviest weight ever: 196 lbs  Previous use of medical weight loss medications: Improved diet, exercise, medications for weight loss.      Physic Tramadol     Social History:  Social History    Socioeconomic History      Marital status:       Spouse name: Not on file      Number of children: Not on file      Years of education: Not on file      Highest education level: Not on file    [address] [place] [address] history.     Initial Intake:  After dinner behavior: peanut butter, homemade guacamole, pork rinds, snap peas, corn chips  Night eating: -  Portion sizes: -  Binge: BED 7 -  Emotional: +  Depression: PHQ total score: 7  Grazing: -  Sweet tooth: -  Crunchy/s occasional palpitations  Gastrointestinal: positive for constipation, diarrhea and Hx IBS   Genitourinary:negative, s/p hysterectomy  Integument/breast: positive for dry skin, occasional eczema hands; hx psoriasis   Hematologic/lymphatic: negative  Musculo Component Value Date/Time    CHOLEST 167 03/26/2021 08:52 AM    LDL 93 03/26/2021 08:52 AM    HDL 45 03/26/2021 08:52 AM    TRIG 146 03/26/2021 08:52 AM    VLDL 29 03/26/2021 08:52 AM       BLE Edema: Continue Furosemide daily, consider spironolactone. 11/2020). Restart Contrave after repeat LFTs. Discussed risks, benefits, rationale, and side effects of medication(s). Patient states understanding. All questions answered.      Avoid metformin and topiramate- not useful.       Avoid Saxenda, did not

## 2021-05-12 ENCOUNTER — OFFICE VISIT (OUTPATIENT)
Dept: SURGERY | Facility: CLINIC | Age: 51
End: 2021-05-12
Payer: COMMERCIAL

## 2021-05-12 VITALS
HEART RATE: 95 BPM | HEIGHT: 61.5 IN | SYSTOLIC BLOOD PRESSURE: 100 MMHG | BODY MASS INDEX: 31.99 KG/M2 | DIASTOLIC BLOOD PRESSURE: 72 MMHG | OXYGEN SATURATION: 96 % | WEIGHT: 171.63 LBS

## 2021-05-12 DIAGNOSIS — R79.89 ELEVATED LFTS: ICD-10-CM

## 2021-05-12 DIAGNOSIS — R53.82 CHRONIC FATIGUE: ICD-10-CM

## 2021-05-12 DIAGNOSIS — F41.9 ANXIETY: ICD-10-CM

## 2021-05-12 DIAGNOSIS — E66.9 OBESITY (BMI 30-39.9): ICD-10-CM

## 2021-05-12 DIAGNOSIS — Z51.81 ENCOUNTER FOR THERAPEUTIC DRUG MONITORING: Primary | ICD-10-CM

## 2021-05-12 DIAGNOSIS — K58.2 IRRITABLE BOWEL SYNDROME WITH BOTH CONSTIPATION AND DIARRHEA: ICD-10-CM

## 2021-05-12 DIAGNOSIS — R73.03 PREDIABETES: ICD-10-CM

## 2021-05-12 DIAGNOSIS — E55.9 VITAMIN D DEFICIENCY: ICD-10-CM

## 2021-05-12 DIAGNOSIS — E78.1 HYPERTRIGLYCERIDEMIA: ICD-10-CM

## 2021-05-12 DIAGNOSIS — F43.9 STRESS: ICD-10-CM

## 2021-05-12 PROCEDURE — 3078F DIAST BP <80 MM HG: CPT | Performed by: NURSE PRACTITIONER

## 2021-05-12 PROCEDURE — 3008F BODY MASS INDEX DOCD: CPT | Performed by: NURSE PRACTITIONER

## 2021-05-12 PROCEDURE — 99214 OFFICE O/P EST MOD 30 MIN: CPT | Performed by: NURSE PRACTITIONER

## 2021-05-12 PROCEDURE — 3074F SYST BP LT 130 MM HG: CPT | Performed by: NURSE PRACTITIONER

## 2021-05-12 RX ORDER — AMOXICILLIN AND CLAVULANATE POTASSIUM 875; 125 MG/1; MG/1
875 TABLET, FILM COATED ORAL
COMMUNITY
Start: 2021-05-07 | End: 2021-05-17

## 2021-05-12 RX ORDER — FLUTICASONE PROPIONATE 50 MCG
2 SPRAY, SUSPENSION (ML) NASAL NIGHTLY
COMMUNITY
Start: 2021-05-07

## 2021-05-12 NOTE — PROGRESS NOTES
Patient:  Adenike Gamez  :      1970  MRN:      [id number]    Chief Complaint:  Weight Management     SUBJECTIVE     History of Present Illness:  Louis  is being seen today for a follow-up for non surgical weight loss. Doing well. History reviewed. No pertinent past medical history.      Comorbidities:  Back pain-Improvement?  no and FRANCISCO-Improvement?  yes    OBJECTIVE     Vitals: /72 (BP Location: Right arm, Patient Position: Sitting)   Pulse 95   Ht 5' 1.5\" (1.562 m)   Wt 171 Spouse name: Not on file      Number of children: Not on file      Years of education: Not on file      Highest education level: Not on file    Occupational History      Not on file    Tobacco Use      Smoking status: Never Smoker      Smokeless tobacco: N peas, corn chips  Night eating: -  Portion sizes: -  Binge: BED 7 -  Emotional: +  Depression: PHQ total score: 7  Grazing: -  Sweet tooth: -  Crunchy/salty: +  Etoh: Rare    Soda Drinker: No       Or very rare      Sports Drinks:  Yes low sugar Powerade hysterectomy  Integument/breast: positive for dry skin, occasional eczema hands; hx psoriasis   Hematologic/lymphatic: negative  Musculoskeletal:positive for back pain  Neurological: positive for headaches and  Hx concussion   Behavioral/Psych: positive fo LFTs    Prediabetes      HYPERTRIGLYCERIDEMIA: Elevated. Recommend dietary changes and lifestyle modifications as discussed below. Monitor.      Lab Results   Component Value Date/Time    CHOLEST 167 03/26/2021 08:52 AM    LDL 93 03/26/2021 08:52 AM    HDL Reviewed medication options for weight loss in detail with patient.     Continue phentermine 37.5 mg in the AM (Restart date: 11/2020). Restart Contrave after repeat LFTs. Discussed risks, benefits, rationale, and side effects of medication(s).

## 2021-07-14 ENCOUNTER — OFFICE VISIT (OUTPATIENT)
Dept: SURGERY | Facility: CLINIC | Age: 51
End: 2021-07-14
Payer: COMMERCIAL

## 2021-07-14 VITALS
HEART RATE: 86 BPM | OXYGEN SATURATION: 97 % | DIASTOLIC BLOOD PRESSURE: 70 MMHG | BODY MASS INDEX: 30.87 KG/M2 | HEIGHT: 61.5 IN | WEIGHT: 165.63 LBS | SYSTOLIC BLOOD PRESSURE: 106 MMHG

## 2021-07-14 DIAGNOSIS — E55.9 VITAMIN D DEFICIENCY: ICD-10-CM

## 2021-07-14 DIAGNOSIS — F41.9 ANXIETY: ICD-10-CM

## 2021-07-14 DIAGNOSIS — R53.82 CHRONIC FATIGUE: ICD-10-CM

## 2021-07-14 DIAGNOSIS — R79.89 ELEVATED LFTS: ICD-10-CM

## 2021-07-14 DIAGNOSIS — Z51.81 ENCOUNTER FOR THERAPEUTIC DRUG MONITORING: Primary | ICD-10-CM

## 2021-07-14 DIAGNOSIS — E66.9 OBESITY (BMI 30-39.9): ICD-10-CM

## 2021-07-14 DIAGNOSIS — E78.1 HYPERTRIGLYCERIDEMIA: ICD-10-CM

## 2021-07-14 DIAGNOSIS — K59.00 CONSTIPATION, UNSPECIFIED CONSTIPATION TYPE: ICD-10-CM

## 2021-07-14 DIAGNOSIS — R73.03 PREDIABETES: ICD-10-CM

## 2021-07-14 DIAGNOSIS — F43.9 STRESS: ICD-10-CM

## 2021-07-14 DIAGNOSIS — K58.2 IRRITABLE BOWEL SYNDROME WITH BOTH CONSTIPATION AND DIARRHEA: ICD-10-CM

## 2021-07-14 PROCEDURE — 99214 OFFICE O/P EST MOD 30 MIN: CPT | Performed by: NURSE PRACTITIONER

## 2021-07-14 PROCEDURE — 3074F SYST BP LT 130 MM HG: CPT | Performed by: NURSE PRACTITIONER

## 2021-07-14 PROCEDURE — 3078F DIAST BP <80 MM HG: CPT | Performed by: NURSE PRACTITIONER

## 2021-07-14 PROCEDURE — 3008F BODY MASS INDEX DOCD: CPT | Performed by: NURSE PRACTITIONER

## 2021-07-14 RX ORDER — PHENTERMINE HYDROCHLORIDE 37.5 MG/1
37.5 CAPSULE ORAL EVERY MORNING
Qty: 30 CAPSULE | Refills: 2 | Status: SHIPPED | OUTPATIENT
Start: 2021-07-14 | End: 2021-12-01

## 2021-07-14 NOTE — PROGRESS NOTES
Patient:  Bing Low  :      1970  MRN:      [id number]    Chief Complaint:  Weight Management     SUBJECTIVE     History of Present Illness:  Moises Amari is being seen today for a follow-up for non surgical weight loss.      Did not rec 106/70 (BP Location: Right arm, Patient Position: Sitting)   Pulse 86   Ht 5' 1.5\" (1.562 m)   Wt 165 lb 9.6 oz (75.1 kg)   SpO2 97%   BMI 30.78 kg/m²     Initial weight loss: -06   Total weight loss: -23   Start weight: 188    Wt Readings from Last 6 Enc Never Smoker      Smokeless tobacco: Never Used    Substance and Sexual Activity      Alcohol use:  Yes        Alcohol/week: 1.0 standard drinks        Types: 1 Shots of liquor per week      Drug use: No      Sexual activity: Not on file    Other Topics Sports Drinks:  Yes low sugar Powerade  Juice:  No     Food Journal  · Reviewed and Discussed:       · Patient has a Food Journal?: yes [employer], 10 day [employer]   · Patient is reading nutrition labels?   yes  · Average Caloric Intake: Reina Parsons   · Torsten Patino Behavioral/Psych: positive for anxiety and stress  Endocrine: negative  All other systems were reviewed and are negative    Physical Exam  General appearance: alert, appears stated age, cooperative and obese  Head: Normocephalic, without obvious abnormal LFTs    Constipation, unspecified constipation type      HYPERTRIGLYCERIDEMIA: Elevated. Recommend dietary changes and lifestyle modifications as discussed below. Monitor.      Lab Results   Component Value Date/Time    CHOLEST 167 03/26/2021 08:52 AM    LD supplement. MTHFR ordered by pcp. Reviewed medication options for weight loss in detail with patient.     Continue phentermine 37.5 mg in the AM (Restart date: 11/2020). Restart Contrave after repeat LFTs.      Discussed risks, benefits, rationale

## 2021-07-14 NOTE — PATIENT INSTRUCTIONS
Magnesium glycinate 200 to 500 mg/day. Life Extension. [facility]. NOW. Update liver tests. Make sure you are getting 64 oz of water daily.      Please concentrate on getting adequate fiber in your diet (40 grams is recommended per day), wh

## 2021-07-18 DIAGNOSIS — E66.9 OBESITY (BMI 30-39.9): ICD-10-CM

## 2021-07-18 DIAGNOSIS — Z51.81 ENCOUNTER FOR THERAPEUTIC DRUG MONITORING: ICD-10-CM

## 2021-07-19 RX ORDER — PHENTERMINE HYDROCHLORIDE 37.5 MG/1
CAPSULE ORAL
Qty: 30 CAPSULE | Refills: 0 | OUTPATIENT
Start: 2021-07-19

## 2021-12-01 ENCOUNTER — TELEMEDICINE (OUTPATIENT)
Dept: SURGERY | Facility: CLINIC | Age: 51
End: 2021-12-01

## 2021-12-01 VITALS — WEIGHT: 170 LBS | BODY MASS INDEX: 32 KG/M2

## 2021-12-01 DIAGNOSIS — K58.2 IRRITABLE BOWEL SYNDROME WITH BOTH CONSTIPATION AND DIARRHEA: ICD-10-CM

## 2021-12-01 DIAGNOSIS — R73.03 PREDIABETES: ICD-10-CM

## 2021-12-01 DIAGNOSIS — K59.00 CONSTIPATION, UNSPECIFIED CONSTIPATION TYPE: ICD-10-CM

## 2021-12-01 DIAGNOSIS — Z51.81 ENCOUNTER FOR THERAPEUTIC DRUG MONITORING: Primary | ICD-10-CM

## 2021-12-01 DIAGNOSIS — E78.1 HYPERTRIGLYCERIDEMIA: ICD-10-CM

## 2021-12-01 DIAGNOSIS — R53.82 CHRONIC FATIGUE: ICD-10-CM

## 2021-12-01 DIAGNOSIS — F41.9 ANXIETY: ICD-10-CM

## 2021-12-01 DIAGNOSIS — E66.9 OBESITY (BMI 30-39.9): ICD-10-CM

## 2021-12-01 DIAGNOSIS — R79.89 ELEVATED LFTS: ICD-10-CM

## 2021-12-01 DIAGNOSIS — E55.9 VITAMIN D DEFICIENCY: ICD-10-CM

## 2021-12-01 DIAGNOSIS — F43.9 STRESS: ICD-10-CM

## 2021-12-01 PROCEDURE — 99214 OFFICE O/P EST MOD 30 MIN: CPT | Performed by: NURSE PRACTITIONER

## 2021-12-01 NOTE — PROGRESS NOTES
Virtual Video & Audio Check-In    Pheobe King George verbally consents to a [employer] Check-In visit on 12/01/21. Patient has been referred to the [facility] website at [email or web address] to review the yearly Consent to Treat document.     Tyra is a 10.     Patient's goal weight: 130 lb  Biggest weight loss in the past: 60 lb  How weight loss was achieved:   Heaviest weight ever: 196 lbs  Previous use of medical weight loss medications: Improved diet, exercise, medications for weight loss.      P (MULTI-VITAMIN/MINERALS) Oral Tab Take 1 tablet by mouth daily.        Allergies:  Latex; Measles, Mumps & Rubella Vac; and Tramadol     Social History:  Social History    Socioeconomic History      Marital status:       Spouse name: Not on file [employer]   · Patient is reading nutrition labels? yes  · Average Caloric Intake: [employer]   · Average CHO Intake: WW 60-80 g  · Is patient exercising? yes  · Type of exercise? Walk for  Steps: 10,000 daily, more on weekends;  [employer] Exam  General appearance: alert, appears stated age, cooperative and obese  Head: Normocephalic, without obvious abnormality, atraumatic  Neck: no adenopathy, no carotid bruit, no JVD, supple, symmetrical, trachea midline and thyroid not enlarged, symmetri (14)    Vitamin D deficiency  -     EKG 12-LEAD; Future  -     COMP METABOLIC PANEL (14)    Elevated LFTs  -     EKG 12-LEAD; Future  -     COMP METABOLIC PANEL (14)    Irritable bowel syndrome with both constipation and diarrhea  -     EKG 12-LEAD;  Future per day. 5. Improve sleep and stress.      Reviewed labs:   5/10/19: TSH, a1c in range. 2/20/18 B 12 in range, Vitamin D essentially in range; Renal function intact. 3/26/21 labs done - elevated LFTs. (Family hx LFTs). A1C 5.7. CRP elevated.    TSH,

## 2021-12-03 ENCOUNTER — EKG ENCOUNTER (OUTPATIENT)
Dept: LAB | Age: 51
End: 2021-12-03
Attending: NURSE PRACTITIONER
Payer: COMMERCIAL

## 2021-12-03 ENCOUNTER — LAB ENCOUNTER (OUTPATIENT)
Dept: LAB | Age: 51
End: 2021-12-03
Attending: NURSE PRACTITIONER
Payer: COMMERCIAL

## 2021-12-03 DIAGNOSIS — Z51.81 ENCOUNTER FOR THERAPEUTIC DRUG MONITORING: Primary | ICD-10-CM

## 2021-12-03 DIAGNOSIS — K59.00 CONSTIPATION, UNSPECIFIED CONSTIPATION TYPE: ICD-10-CM

## 2021-12-03 DIAGNOSIS — R73.03 PREDIABETES: ICD-10-CM

## 2021-12-03 DIAGNOSIS — F41.9 ANXIETY: ICD-10-CM

## 2021-12-03 DIAGNOSIS — E78.1 HYPERTRIGLYCERIDEMIA: ICD-10-CM

## 2021-12-03 DIAGNOSIS — E55.9 VITAMIN D DEFICIENCY: ICD-10-CM

## 2021-12-03 DIAGNOSIS — K58.2 IRRITABLE BOWEL SYNDROME WITH BOTH CONSTIPATION AND DIARRHEA: ICD-10-CM

## 2021-12-03 DIAGNOSIS — E66.9 OBESITY (BMI 30-39.9): ICD-10-CM

## 2021-12-03 DIAGNOSIS — F43.9 STRESS: ICD-10-CM

## 2021-12-03 DIAGNOSIS — R53.82 CHRONIC FATIGUE: ICD-10-CM

## 2021-12-03 DIAGNOSIS — R79.89 ELEVATED LFTS: ICD-10-CM

## 2021-12-03 PROCEDURE — 80053 COMPREHEN METABOLIC PANEL: CPT | Performed by: NURSE PRACTITIONER

## 2021-12-03 PROCEDURE — 93010 ELECTROCARDIOGRAM REPORT: CPT | Performed by: INTERNAL MEDICINE

## 2021-12-03 PROCEDURE — 93005 ELECTROCARDIOGRAM TRACING: CPT

## 2021-12-06 DIAGNOSIS — Z51.81 ENCOUNTER FOR THERAPEUTIC DRUG MONITORING: Primary | ICD-10-CM

## 2021-12-06 DIAGNOSIS — E66.9 OBESITY (BMI 30-39.9): ICD-10-CM

## 2021-12-06 RX ORDER — PHENTERMINE AND TOPIRAMATE 7.5; 46 MG/1; MG/1
1 CAPSULE, EXTENDED RELEASE ORAL DAILY
Qty: 30 CAPSULE | Refills: 2 | Status: SHIPPED | OUTPATIENT
Start: 2021-12-06 | End: 2022-01-13

## 2021-12-10 ENCOUNTER — OFFICE VISIT (OUTPATIENT)
Dept: FAMILY MEDICINE CLINIC | Facility: CLINIC | Age: 51
End: 2021-12-10
Payer: COMMERCIAL

## 2021-12-10 VITALS
BODY MASS INDEX: 31.28 KG/M2 | RESPIRATION RATE: 17 BRPM | HEART RATE: 83 BPM | WEIGHT: 170 LBS | OXYGEN SATURATION: 99 % | DIASTOLIC BLOOD PRESSURE: 70 MMHG | HEIGHT: 62 IN | TEMPERATURE: 98 F | SYSTOLIC BLOOD PRESSURE: 112 MMHG

## 2021-12-10 DIAGNOSIS — J01.40 ACUTE NON-RECURRENT PANSINUSITIS: Primary | ICD-10-CM

## 2021-12-10 PROCEDURE — 3008F BODY MASS INDEX DOCD: CPT | Performed by: PHYSICIAN ASSISTANT

## 2021-12-10 PROCEDURE — 3074F SYST BP LT 130 MM HG: CPT | Performed by: PHYSICIAN ASSISTANT

## 2021-12-10 PROCEDURE — 99213 OFFICE O/P EST LOW 20 MIN: CPT | Performed by: PHYSICIAN ASSISTANT

## 2021-12-10 PROCEDURE — 3078F DIAST BP <80 MM HG: CPT | Performed by: PHYSICIAN ASSISTANT

## 2021-12-10 RX ORDER — AMOXICILLIN AND CLAVULANATE POTASSIUM 875; 125 MG/1; MG/1
1 TABLET, FILM COATED ORAL 2 TIMES DAILY
Qty: 14 TABLET | Refills: 0 | Status: SHIPPED | OUTPATIENT
Start: 2021-12-10 | End: 2021-12-17

## 2021-12-10 RX ORDER — FLUCONAZOLE 150 MG/1
150 TABLET ORAL ONCE
Qty: 1 TABLET | Refills: 0 | Status: SHIPPED | OUTPATIENT
Start: 2021-12-10 | End: 2021-12-10

## 2021-12-10 NOTE — PROGRESS NOTES
CHIEF COMPLAINT:   Patient presents with:  Sinus Problem      HPI:   Bony Ovalle is a 53year old female who presents for sinus congestion for 8 days. Symptoms have been worsening since onset.  Sinus congestion/pain is described as a pressure and is lo status: Never Smoker      Smokeless tobacco: Never Used    Alcohol use:  Yes      Alcohol/week: 1.0 standard drink      Types: 1 Shots of liquor per week    Drug use: No        REVIEW OF SYSTEMS:   GENERAL: feels well otherwise,  normal appetite  HEENT: See • amoxicillin clavulanate 875-125 MG Oral Tab 14 tablet 0     Sig: Take 1 tablet by mouth 2 (two) times daily for 7 days. • fluconazole (DIFLUCAN) 150 MG Oral Tab 1 tablet 0     Sig: Take 1 tablet (150 mg total) by mouth once for 1 dose.            Tyra you do, your symptoms may get worse. You may also take pills that contain pseudoephedrine. Don’t use products that combine multiple medicines. This is because side effects may be increased. Read labels. You can also ask the pharmacist for help.  (People wit colds, or other upper respiratory infections. · Don’t smoke, and stay away from secondhand smoke. · Stay up to date with of your vaccines. Marie last reviewed this educational content on 12/1/2019 © 2000-2021 [employer].  All rights re

## 2022-01-13 DIAGNOSIS — Z51.81 ENCOUNTER FOR THERAPEUTIC DRUG MONITORING: ICD-10-CM

## 2022-01-13 DIAGNOSIS — E66.9 OBESITY (BMI 30-39.9): ICD-10-CM

## 2022-01-13 RX ORDER — NALTREXONE HYDROCHLORIDE AND BUPROPION HYDROCHLORIDE 8; 90 MG/1; MG/1
TABLET, EXTENDED RELEASE ORAL
Qty: 120 TABLET | Refills: 1 | Status: SHIPPED | OUTPATIENT
Start: 2022-01-13

## 2022-03-09 RX ORDER — HYDROCHLOROTHIAZIDE 12.5 MG/1
12.5 TABLET ORAL DAILY
Qty: 30 TABLET | Refills: 1 | Status: SHIPPED | OUTPATIENT
Start: 2022-03-09

## 2022-03-09 RX ORDER — PHENTERMINE HYDROCHLORIDE 37.5 MG/1
37.5 TABLET ORAL
Qty: 30 TABLET | Refills: 1 | Status: SHIPPED | OUTPATIENT
Start: 2022-03-09

## 2022-05-24 ENCOUNTER — OFFICE VISIT (OUTPATIENT)
Dept: ENDOCRINOLOGY CLINIC | Facility: CLINIC | Age: 52
End: 2022-05-24
Payer: COMMERCIAL

## 2022-05-24 ENCOUNTER — LAB ENCOUNTER (OUTPATIENT)
Dept: LAB | Age: 52
End: 2022-05-24
Attending: STUDENT IN AN ORGANIZED HEALTH CARE EDUCATION/TRAINING PROGRAM
Payer: COMMERCIAL

## 2022-05-24 VITALS
HEART RATE: 71 BPM | WEIGHT: 182.63 LBS | BODY MASS INDEX: 33 KG/M2 | OXYGEN SATURATION: 96 % | SYSTOLIC BLOOD PRESSURE: 120 MMHG | DIASTOLIC BLOOD PRESSURE: 74 MMHG

## 2022-05-24 DIAGNOSIS — Z83.49 FAMILY HISTORY OF THYROID DISEASE: Primary | ICD-10-CM

## 2022-05-24 LAB
T3 SERPL-MCNC: 130 NG/DL (ref 60–181)
T4 FREE SERPL-MCNC: 0.9 NG/DL (ref 0.8–1.7)
THYROGLOB SERPL-MCNC: <15 U/ML (ref ?–60)
THYROPEROXIDASE AB SERPL-ACNC: <28 U/ML (ref ?–60)
TSI SER-ACNC: 1.4 MIU/ML (ref 0.36–3.74)

## 2022-05-24 PROCEDURE — 3078F DIAST BP <80 MM HG: CPT | Performed by: STUDENT IN AN ORGANIZED HEALTH CARE EDUCATION/TRAINING PROGRAM

## 2022-05-24 PROCEDURE — 84480 ASSAY TRIIODOTHYRONINE (T3): CPT | Performed by: STUDENT IN AN ORGANIZED HEALTH CARE EDUCATION/TRAINING PROGRAM

## 2022-05-24 PROCEDURE — 86376 MICROSOMAL ANTIBODY EACH: CPT | Performed by: STUDENT IN AN ORGANIZED HEALTH CARE EDUCATION/TRAINING PROGRAM

## 2022-05-24 PROCEDURE — 84443 ASSAY THYROID STIM HORMONE: CPT | Performed by: STUDENT IN AN ORGANIZED HEALTH CARE EDUCATION/TRAINING PROGRAM

## 2022-05-24 PROCEDURE — 99204 OFFICE O/P NEW MOD 45 MIN: CPT | Performed by: STUDENT IN AN ORGANIZED HEALTH CARE EDUCATION/TRAINING PROGRAM

## 2022-05-24 PROCEDURE — 84439 ASSAY OF FREE THYROXINE: CPT | Performed by: STUDENT IN AN ORGANIZED HEALTH CARE EDUCATION/TRAINING PROGRAM

## 2022-05-24 PROCEDURE — 86800 THYROGLOBULIN ANTIBODY: CPT | Performed by: STUDENT IN AN ORGANIZED HEALTH CARE EDUCATION/TRAINING PROGRAM

## 2022-05-24 PROCEDURE — 3074F SYST BP LT 130 MM HG: CPT | Performed by: STUDENT IN AN ORGANIZED HEALTH CARE EDUCATION/TRAINING PROGRAM

## 2022-05-26 RX ORDER — PHENTERMINE HYDROCHLORIDE 37.5 MG/1
37.5 TABLET ORAL
Qty: 14 TABLET | Refills: 0 | Status: SHIPPED | OUTPATIENT
Start: 2022-05-26 | End: 2022-06-08

## 2022-05-26 RX ORDER — HYDROCHLOROTHIAZIDE 12.5 MG/1
12.5 TABLET ORAL DAILY
Qty: 30 TABLET | Refills: 1 | Status: SHIPPED | OUTPATIENT
Start: 2022-05-26 | End: 2022-06-01

## 2022-06-08 ENCOUNTER — TELEMEDICINE (OUTPATIENT)
Dept: SURGERY | Facility: CLINIC | Age: 52
End: 2022-06-08

## 2022-06-08 VITALS — BODY MASS INDEX: 34 KG/M2 | WEIGHT: 183.19 LBS

## 2022-06-08 DIAGNOSIS — E66.9 OBESITY (BMI 30-39.9): ICD-10-CM

## 2022-06-08 DIAGNOSIS — R53.82 CHRONIC FATIGUE: ICD-10-CM

## 2022-06-08 DIAGNOSIS — Z51.81 ENCOUNTER FOR THERAPEUTIC DRUG MONITORING: Primary | ICD-10-CM

## 2022-06-08 DIAGNOSIS — R60.9 EDEMA, UNSPECIFIED TYPE: ICD-10-CM

## 2022-06-08 DIAGNOSIS — F41.9 ANXIETY: ICD-10-CM

## 2022-06-08 PROCEDURE — 99214 OFFICE O/P EST MOD 30 MIN: CPT | Performed by: NURSE PRACTITIONER

## 2022-06-08 RX ORDER — NALTREXONE HYDROCHLORIDE AND BUPROPION HYDROCHLORIDE 8; 90 MG/1; MG/1
TABLET, EXTENDED RELEASE ORAL
Qty: 120 TABLET | Refills: 2 | Status: SHIPPED | OUTPATIENT
Start: 2022-06-08

## 2022-07-19 ENCOUNTER — DOCUMENTATION ONLY (OUTPATIENT)
Dept: SURGERY | Facility: CLINIC | Age: 52
End: 2022-07-19

## 2022-07-19 ENCOUNTER — TELEPHONE (OUTPATIENT)
Dept: SURGERY | Facility: CLINIC | Age: 52
End: 2022-07-19

## 2022-11-28 ENCOUNTER — LAB ENCOUNTER (OUTPATIENT)
Dept: LAB | Age: 52
End: 2022-11-28
Attending: FAMILY MEDICINE
Payer: COMMERCIAL

## 2022-11-28 ENCOUNTER — OFFICE VISIT (OUTPATIENT)
Dept: INTEGRATIVE MEDICINE | Facility: CLINIC | Age: 52
End: 2022-11-28
Payer: COMMERCIAL

## 2022-11-28 VITALS
BODY MASS INDEX: 35 KG/M2 | WEIGHT: 193.63 LBS | OXYGEN SATURATION: 97 % | SYSTOLIC BLOOD PRESSURE: 100 MMHG | HEART RATE: 83 BPM | DIASTOLIC BLOOD PRESSURE: 70 MMHG

## 2022-11-28 DIAGNOSIS — Z15.89 MTHFR MUTATION: ICD-10-CM

## 2022-11-28 DIAGNOSIS — E66.9 CLASS 1 OBESITY WITHOUT SERIOUS COMORBIDITY WITH BODY MASS INDEX (BMI) OF 30.0 TO 30.9 IN ADULT, UNSPECIFIED OBESITY TYPE: Primary | ICD-10-CM

## 2022-11-28 DIAGNOSIS — R79.89 ELEVATED LFTS: ICD-10-CM

## 2022-11-28 DIAGNOSIS — N95.1 PERI-MENOPAUSE: ICD-10-CM

## 2022-11-28 DIAGNOSIS — R73.03 PREDIABETES: ICD-10-CM

## 2022-11-28 DIAGNOSIS — R79.82 ELEVATED C-REACTIVE PROTEIN (CRP): ICD-10-CM

## 2022-11-28 DIAGNOSIS — K58.2 IRRITABLE BOWEL SYNDROME WITH BOTH CONSTIPATION AND DIARRHEA: ICD-10-CM

## 2022-11-28 DIAGNOSIS — E66.9 CLASS 1 OBESITY WITHOUT SERIOUS COMORBIDITY WITH BODY MASS INDEX (BMI) OF 30.0 TO 30.9 IN ADULT, UNSPECIFIED OBESITY TYPE: ICD-10-CM

## 2022-11-28 DIAGNOSIS — E07.9 DISORDER OF THYROID: ICD-10-CM

## 2022-11-28 PROCEDURE — 3078F DIAST BP <80 MM HG: CPT | Performed by: FAMILY MEDICINE

## 2022-11-28 PROCEDURE — 84140 ASSAY OF PREGNENOLONE: CPT

## 2022-11-28 PROCEDURE — 36415 COLL VENOUS BLD VENIPUNCTURE: CPT

## 2022-11-28 PROCEDURE — 3074F SYST BP LT 130 MM HG: CPT | Performed by: FAMILY MEDICINE

## 2022-11-28 PROCEDURE — 83018 HEAVY METAL QUAN EACH NES: CPT

## 2022-11-28 PROCEDURE — 99205 OFFICE O/P NEW HI 60 MIN: CPT | Performed by: FAMILY MEDICINE

## 2022-11-28 RX ORDER — LORATADINE 10 MG/1
TABLET ORAL
COMMUNITY

## 2022-12-02 LAB
CREATININE, URINE - PER VOLUME: 77 MG/DL
IODINE, URINE - PRE GRAM OF CRT: 111.4 UG/G CRT
IODINE, URINE - PRE VOLUME: 85.8 UG/L
PREGNENOLONE BY MS/MS, SERUM: 74 NG/DL

## 2022-12-27 ENCOUNTER — PATIENT MESSAGE (OUTPATIENT)
Dept: INTEGRATIVE MEDICINE | Facility: CLINIC | Age: 52
End: 2022-12-27

## 2023-01-05 ENCOUNTER — LAB ENCOUNTER (OUTPATIENT)
Dept: LAB | Age: 53
End: 2023-01-05
Attending: FAMILY MEDICINE
Payer: COMMERCIAL

## 2023-01-05 DIAGNOSIS — E66.9 OBESITY, UNSPECIFIED: ICD-10-CM

## 2023-01-05 DIAGNOSIS — K58.2 IRRITABLE BOWEL SYNDROME WITH CONSTIPATION AND DIARRHEA: Primary | ICD-10-CM

## 2023-01-05 DIAGNOSIS — R73.03 BORDERLINE DIABETES: ICD-10-CM

## 2023-01-05 DIAGNOSIS — R79.89 HYPOURICEMIA: ICD-10-CM

## 2023-01-05 LAB
ALBUMIN SERPL-MCNC: 3.7 G/DL (ref 3.4–5)
ALBUMIN/GLOB SERPL: 1.1 {RATIO} (ref 1–2)
ALP LIVER SERPL-CCNC: 105 U/L
ALT SERPL-CCNC: 25 U/L
ANION GAP SERPL CALC-SCNC: 8 MMOL/L (ref 0–18)
AST SERPL-CCNC: 17 U/L (ref 15–37)
BASOPHILS # BLD AUTO: 0.05 X10(3) UL (ref 0–0.2)
BASOPHILS NFR BLD AUTO: 0.9 %
BILIRUB SERPL-MCNC: 0.6 MG/DL (ref 0.1–2)
BUN BLD-MCNC: 16 MG/DL (ref 7–18)
CALCIUM BLD-MCNC: 9.2 MG/DL (ref 8.5–10.1)
CHLORIDE SERPL-SCNC: 108 MMOL/L (ref 98–112)
CHOLEST SERPL-MCNC: 173 MG/DL (ref ?–200)
CO2 SERPL-SCNC: 26 MMOL/L (ref 21–32)
CREAT BLD-MCNC: 0.72 MG/DL
DHEA-S SERPL-MCNC: 66.2 UG/DL
EOSINOPHIL # BLD AUTO: 0.17 X10(3) UL (ref 0–0.7)
EOSINOPHIL NFR BLD AUTO: 3.1 %
ERYTHROCYTE [DISTWIDTH] IN BLOOD BY AUTOMATED COUNT: 12.8 %
EST. AVERAGE GLUCOSE BLD GHB EST-MCNC: 105 MG/DL (ref 68–126)
FASTING PATIENT LIPID ANSWER: YES
FASTING STATUS PATIENT QL REPORTED: YES
FSH SERPL-ACNC: 57.6 MIU/ML
GFR SERPLBLD BASED ON 1.73 SQ M-ARVRAT: 101 ML/MIN/1.73M2 (ref 60–?)
GLOBULIN PLAS-MCNC: 3.5 G/DL (ref 2.8–4.4)
GLUCOSE BLD-MCNC: 98 MG/DL (ref 70–99)
HBA1C MFR BLD: 5.3 % (ref ?–5.7)
HCT VFR BLD AUTO: 41.1 %
HCYS SERPL-SCNC: 5.1 UMOL/L (ref 3.2–10.7)
HDLC SERPL-MCNC: 44 MG/DL (ref 40–59)
HGB BLD-MCNC: 13.8 G/DL
IMM GRANULOCYTES # BLD AUTO: 0.01 X10(3) UL (ref 0–1)
IMM GRANULOCYTES NFR BLD: 0.2 %
INSULIN SERPL-ACNC: 10.8 MU/L (ref 3–25)
LDLC SERPL CALC-MCNC: 102 MG/DL (ref ?–100)
LH SERPL-ACNC: 17.2 MIU/ML
LYMPHOCYTES # BLD AUTO: 1.56 X10(3) UL (ref 1–4)
LYMPHOCYTES NFR BLD AUTO: 28.5 %
MCH RBC QN AUTO: 30.2 PG (ref 26–34)
MCHC RBC AUTO-ENTMCNC: 33.6 G/DL (ref 31–37)
MCV RBC AUTO: 89.9 FL
MONOCYTES # BLD AUTO: 0.44 X10(3) UL (ref 0.1–1)
MONOCYTES NFR BLD AUTO: 8 %
NEUTROPHILS # BLD AUTO: 3.25 X10 (3) UL (ref 1.5–7.7)
NEUTROPHILS # BLD AUTO: 3.25 X10(3) UL (ref 1.5–7.7)
NEUTROPHILS NFR BLD AUTO: 59.3 %
NONHDLC SERPL-MCNC: 129 MG/DL (ref ?–130)
OSMOLALITY SERPL CALC.SUM OF ELEC: 295 MOSM/KG (ref 275–295)
PLATELET # BLD AUTO: 254 10(3)UL (ref 150–450)
POTASSIUM SERPL-SCNC: 4.2 MMOL/L (ref 3.5–5.1)
PROGEST SERPL-MCNC: 0.21 NG/ML
PROT SERPL-MCNC: 7.2 G/DL (ref 6.4–8.2)
RBC # BLD AUTO: 4.57 X10(6)UL
SODIUM SERPL-SCNC: 142 MMOL/L (ref 136–145)
TRIGL SERPL-MCNC: 156 MG/DL (ref 30–149)
VLDLC SERPL CALC-MCNC: 26 MG/DL (ref 0–30)
WBC # BLD AUTO: 5.5 X10(3) UL (ref 4–11)

## 2023-01-05 PROCEDURE — 36415 COLL VENOUS BLD VENIPUNCTURE: CPT | Performed by: FAMILY MEDICINE

## 2023-01-05 PROCEDURE — 85025 COMPLETE CBC W/AUTO DIFF WBC: CPT | Performed by: FAMILY MEDICINE

## 2023-01-05 PROCEDURE — 83036 HEMOGLOBIN GLYCOSYLATED A1C: CPT | Performed by: FAMILY MEDICINE

## 2023-01-05 PROCEDURE — 83001 ASSAY OF GONADOTROPIN (FSH): CPT | Performed by: FAMILY MEDICINE

## 2023-01-05 PROCEDURE — 82627 DEHYDROEPIANDROSTERONE: CPT | Performed by: FAMILY MEDICINE

## 2023-01-05 PROCEDURE — 80061 LIPID PANEL: CPT | Performed by: FAMILY MEDICINE

## 2023-01-05 PROCEDURE — 83090 ASSAY OF HOMOCYSTEINE: CPT | Performed by: FAMILY MEDICINE

## 2023-01-05 PROCEDURE — 83525 ASSAY OF INSULIN: CPT | Performed by: FAMILY MEDICINE

## 2023-01-05 PROCEDURE — 80053 COMPREHEN METABOLIC PANEL: CPT | Performed by: FAMILY MEDICINE

## 2023-01-05 PROCEDURE — 84402 ASSAY OF FREE TESTOSTERONE: CPT

## 2023-01-05 PROCEDURE — 82671 ASSAY OF ESTROGENS: CPT

## 2023-01-05 PROCEDURE — 84403 ASSAY OF TOTAL TESTOSTERONE: CPT

## 2023-01-05 PROCEDURE — 83002 ASSAY OF GONADOTROPIN (LH): CPT | Performed by: FAMILY MEDICINE

## 2023-01-05 PROCEDURE — 84144 ASSAY OF PROGESTERONE: CPT | Performed by: FAMILY MEDICINE

## 2023-01-10 LAB
SEX HORMONE BINDING GLOBULIN: 43 NMOL/L
TESTOSTERONE -MS, BIOAVAILAB: 5.4 NG/DL
TESTOSTERONE, -MS/MS: 14 NG/DL
TESTOSTERONE, FREE -MS/MS: 2 PG/ML

## 2023-01-12 ENCOUNTER — TELEMEDICINE (OUTPATIENT)
Dept: INTEGRATIVE MEDICINE | Facility: CLINIC | Age: 53
End: 2023-01-12
Payer: COMMERCIAL

## 2023-01-12 DIAGNOSIS — R73.03 PREDIABETES: Primary | ICD-10-CM

## 2023-01-12 DIAGNOSIS — E07.9 DISORDER OF THYROID: ICD-10-CM

## 2023-01-12 DIAGNOSIS — N95.1 PERI-MENOPAUSE: ICD-10-CM

## 2023-01-12 DIAGNOSIS — K58.2 IRRITABLE BOWEL SYNDROME WITH BOTH CONSTIPATION AND DIARRHEA: ICD-10-CM

## 2023-01-12 DIAGNOSIS — Z15.89 MTHFR MUTATION: ICD-10-CM

## 2023-01-12 PROCEDURE — 99214 OFFICE O/P EST MOD 30 MIN: CPT | Performed by: FAMILY MEDICINE

## 2023-01-14 LAB
ESTRADIOL BY TMS: 3.7 PG/ML
ESTROGENS TOTAL CALCULATION: 17.2 PG/ML
ESTRONE BY TMS: 13.5 PG/ML

## 2023-02-27 ENCOUNTER — OFFICE VISIT (OUTPATIENT)
Dept: SURGERY | Facility: CLINIC | Age: 53
End: 2023-02-27
Payer: COMMERCIAL

## 2023-02-27 VITALS
BODY MASS INDEX: 36.35 KG/M2 | OXYGEN SATURATION: 100 % | SYSTOLIC BLOOD PRESSURE: 102 MMHG | DIASTOLIC BLOOD PRESSURE: 70 MMHG | HEIGHT: 61.5 IN | HEART RATE: 68 BPM | WEIGHT: 195 LBS

## 2023-02-27 DIAGNOSIS — E78.1 HYPERTRIGLYCERIDEMIA: ICD-10-CM

## 2023-02-27 DIAGNOSIS — Z51.81 ENCOUNTER FOR THERAPEUTIC DRUG MONITORING: ICD-10-CM

## 2023-02-27 DIAGNOSIS — R53.82 CHRONIC FATIGUE: ICD-10-CM

## 2023-02-27 DIAGNOSIS — F41.9 ANXIETY: ICD-10-CM

## 2023-02-27 DIAGNOSIS — K58.2 IRRITABLE BOWEL SYNDROME WITH BOTH CONSTIPATION AND DIARRHEA: ICD-10-CM

## 2023-02-27 DIAGNOSIS — E78.5 DYSLIPIDEMIA: Primary | ICD-10-CM

## 2023-02-27 DIAGNOSIS — E66.9 OBESITY (BMI 30-39.9): ICD-10-CM

## 2023-02-27 DIAGNOSIS — R73.03 PREDIABETES: ICD-10-CM

## 2023-02-27 DIAGNOSIS — F43.9 STRESS: ICD-10-CM

## 2023-02-27 DIAGNOSIS — E55.9 VITAMIN D DEFICIENCY: ICD-10-CM

## 2023-02-27 DIAGNOSIS — R60.9 EDEMA, UNSPECIFIED TYPE: ICD-10-CM

## 2023-02-27 PROCEDURE — 3008F BODY MASS INDEX DOCD: CPT | Performed by: NURSE PRACTITIONER

## 2023-02-27 PROCEDURE — 99214 OFFICE O/P EST MOD 30 MIN: CPT | Performed by: NURSE PRACTITIONER

## 2023-02-27 PROCEDURE — 3074F SYST BP LT 130 MM HG: CPT | Performed by: NURSE PRACTITIONER

## 2023-02-27 PROCEDURE — 3078F DIAST BP <80 MM HG: CPT | Performed by: NURSE PRACTITIONER

## 2023-02-27 RX ORDER — FUROSEMIDE 20 MG/1
20 TABLET ORAL DAILY
Qty: 90 TABLET | Refills: 1 | Status: SHIPPED | OUTPATIENT
Start: 2023-02-27

## 2023-02-27 NOTE — PATIENT INSTRUCTIONS
[facility] schedule:    0.25 mg/week x 4 weeks  0.5 mg/week x 4 weeks  1 mg/week x 4 weeks  1.7 mg/week x 4 weeks  2.4 mg/week thereafter

## 2023-03-01 ENCOUNTER — TELEMEDICINE (OUTPATIENT)
Dept: INTEGRATIVE MEDICINE | Facility: CLINIC | Age: 53
End: 2023-03-01
Payer: COMMERCIAL

## 2023-03-01 DIAGNOSIS — R60.9 EDEMA, UNSPECIFIED TYPE: ICD-10-CM

## 2023-03-01 DIAGNOSIS — R73.03 PREDIABETES: ICD-10-CM

## 2023-03-01 DIAGNOSIS — E78.1 HYPERTRIGLYCERIDEMIA: ICD-10-CM

## 2023-03-01 DIAGNOSIS — E55.9 VITAMIN D DEFICIENCY: ICD-10-CM

## 2023-03-01 DIAGNOSIS — K58.2 IRRITABLE BOWEL SYNDROME WITH BOTH CONSTIPATION AND DIARRHEA: ICD-10-CM

## 2023-03-01 DIAGNOSIS — E78.5 DYSLIPIDEMIA: ICD-10-CM

## 2023-03-01 DIAGNOSIS — E66.9 OBESITY (BMI 30-39.9): ICD-10-CM

## 2023-03-01 DIAGNOSIS — R53.82 CHRONIC FATIGUE: ICD-10-CM

## 2023-03-01 PROCEDURE — 97802 MEDICAL NUTRITION INDIV IN: CPT

## 2023-03-01 NOTE — PATIENT INSTRUCTIONS
Extensive scientific evidence supports the use of a whole-food, plant-based  (WFPB) dietary approach as treatment for diet-related chronic diseases. Utilize the above recipes to assist you in meeting your personalized nutrition goals. Please note this reference is intended to be used as a guide towards building healthier meals. It is not a diet plan. [email or web address]/wp-content/uploads/2022/07/[email or web address]-Food-[email or web address]-Medicine-[facility]-[email or web address].pdf      Your goal is to have 2/3 of your plate full of vegetables, fruits, whole grains, beans, and legumes at each meal. 1/3 or less can be animal protein (lean meat, fatty fish, eggs, low-fat dairy).  Utilize new recipes to find creative ways to enjoy your meals!    [email or web address]

## 2023-03-06 ENCOUNTER — TELEPHONE (OUTPATIENT)
Dept: FAMILY MEDICINE CLINIC | Facility: CLINIC | Age: 53
End: 2023-03-06

## 2023-03-06 DIAGNOSIS — Z51.81 ENCOUNTER FOR THERAPEUTIC DRUG MONITORING: Primary | ICD-10-CM

## 2023-03-06 DIAGNOSIS — E66.9 OBESITY (BMI 30-39.9): ICD-10-CM

## 2023-03-06 RX ORDER — PHENTERMINE HYDROCHLORIDE 30 MG/1
30 CAPSULE ORAL EVERY MORNING
Qty: 30 CAPSULE | Refills: 2 | Status: SHIPPED | OUTPATIENT
Start: 2023-03-06

## 2023-05-08 ENCOUNTER — TELEMEDICINE (OUTPATIENT)
Dept: INTEGRATIVE MEDICINE | Facility: CLINIC | Age: 53
End: 2023-05-08
Payer: COMMERCIAL

## 2023-05-08 DIAGNOSIS — Z71.3 ENCOUNTER FOR WEIGHT LOSS COUNSELING: ICD-10-CM

## 2023-05-08 DIAGNOSIS — Z91.018 MULTIPLE FOOD ALLERGIES: Primary | ICD-10-CM

## 2023-05-08 DIAGNOSIS — Z72.3 LACK OF PHYSICAL ACTIVITY: ICD-10-CM

## 2023-05-08 DIAGNOSIS — E66.9 OBESITY (BMI 35.0-39.9 WITHOUT COMORBIDITY): ICD-10-CM

## 2023-05-08 NOTE — PATIENT INSTRUCTIONS
Create your own meal plan using recipes and resources provided. Utilize this every week to create your grocery list and plan your meal prep. (Grilling, instant pot, sheet pans).     Meal Monday Tuesday Wednesday Thursday Friday Saturday Sunday   Breakfast          AM Snack          Lunch          PM [facility]

## 2023-05-17 ENCOUNTER — TELEMEDICINE (OUTPATIENT)
Dept: SURGERY | Facility: CLINIC | Age: 53
End: 2023-05-17
Payer: COMMERCIAL

## 2023-05-17 VITALS — BODY MASS INDEX: 35 KG/M2 | WEIGHT: 190 LBS

## 2023-05-17 DIAGNOSIS — R53.82 CHRONIC FATIGUE: ICD-10-CM

## 2023-05-17 DIAGNOSIS — R60.9 EDEMA, UNSPECIFIED TYPE: ICD-10-CM

## 2023-05-17 DIAGNOSIS — F41.9 ANXIETY: ICD-10-CM

## 2023-05-17 DIAGNOSIS — K58.2 IRRITABLE BOWEL SYNDROME WITH BOTH CONSTIPATION AND DIARRHEA: ICD-10-CM

## 2023-05-17 DIAGNOSIS — E78.5 DYSLIPIDEMIA: Primary | ICD-10-CM

## 2023-05-17 DIAGNOSIS — E55.9 VITAMIN D DEFICIENCY: ICD-10-CM

## 2023-05-17 DIAGNOSIS — R73.03 PREDIABETES: ICD-10-CM

## 2023-05-17 DIAGNOSIS — E78.1 HYPERTRIGLYCERIDEMIA: ICD-10-CM

## 2023-05-17 DIAGNOSIS — Z51.81 ENCOUNTER FOR THERAPEUTIC DRUG MONITORING: ICD-10-CM

## 2023-05-17 DIAGNOSIS — F43.9 STRESS: ICD-10-CM

## 2023-05-17 DIAGNOSIS — E66.9 OBESITY (BMI 30-39.9): ICD-10-CM

## 2023-05-17 PROCEDURE — 99214 OFFICE O/P EST MOD 30 MIN: CPT | Performed by: NURSE PRACTITIONER

## 2023-05-17 RX ORDER — PHENTERMINE HYDROCHLORIDE 37.5 MG/1
37.5 TABLET ORAL
Qty: 30 TABLET | Refills: 3 | Status: SHIPPED | OUTPATIENT
Start: 2023-05-17

## 2023-07-24 ENCOUNTER — TELEMEDICINE (OUTPATIENT)
Dept: INTEGRATIVE MEDICINE | Facility: CLINIC | Age: 53
End: 2023-07-24
Payer: COMMERCIAL

## 2023-07-24 DIAGNOSIS — E66.9 OBESITY (BMI 35.0-39.9 WITHOUT COMORBIDITY): ICD-10-CM

## 2023-07-24 DIAGNOSIS — Z72.3 LACK OF PHYSICAL ACTIVITY: ICD-10-CM

## 2023-07-24 DIAGNOSIS — Z71.3 ENCOUNTER FOR WEIGHT LOSS COUNSELING: ICD-10-CM

## 2023-07-24 DIAGNOSIS — Z91.018 MULTIPLE FOOD ALLERGIES: Primary | ICD-10-CM

## 2023-07-24 NOTE — PATIENT INSTRUCTIONS
Resources:    [email or web address]  07:00 PM - 08:30 PM CDT  ONLINE - Caregiver Support Group - CONTACT Bonnie Felix TO REGISTER  [email or web address]    New Goals:   Take walks by the river  Get back to gardening  Plan to spend sometime with grandchildren

## 2023-09-11 DIAGNOSIS — R60.9 EDEMA, UNSPECIFIED TYPE: ICD-10-CM

## 2023-09-11 RX ORDER — FUROSEMIDE 20 MG/1
20 TABLET ORAL DAILY
Qty: 90 TABLET | Refills: 0 | Status: SHIPPED | OUTPATIENT
Start: 2023-09-11

## 2023-11-17 DIAGNOSIS — Z51.81 ENCOUNTER FOR THERAPEUTIC DRUG MONITORING: ICD-10-CM

## 2023-11-17 DIAGNOSIS — E66.9 OBESITY (BMI 30-39.9): ICD-10-CM

## 2023-11-17 RX ORDER — PHENTERMINE HYDROCHLORIDE 37.5 MG/1
37.5 TABLET ORAL
Qty: 30 TABLET | Refills: 0 | OUTPATIENT
Start: 2023-11-17

## 2023-12-05 ENCOUNTER — HOSPITAL ENCOUNTER (OUTPATIENT)
Age: 53
Discharge: HOME | End: 2023-12-05
Payer: COMMERCIAL

## 2023-12-05 VITALS
WEIGHT: 180 LBS | HEART RATE: 75 BPM | BODY MASS INDEX: 33.13 KG/M2 | DIASTOLIC BLOOD PRESSURE: 69 MMHG | TEMPERATURE: 98 F | OXYGEN SATURATION: 97 % | RESPIRATION RATE: 18 BRPM | SYSTOLIC BLOOD PRESSURE: 132 MMHG | HEIGHT: 62 IN

## 2023-12-05 DIAGNOSIS — J40 SINOBRONCHITIS: Primary | ICD-10-CM

## 2023-12-05 DIAGNOSIS — J32.9 SINOBRONCHITIS: Primary | ICD-10-CM

## 2023-12-05 PROCEDURE — 99213 OFFICE O/P EST LOW 20 MIN: CPT | Performed by: NURSE PRACTITIONER

## 2023-12-05 RX ORDER — PREDNISONE 20 MG/1
40 TABLET ORAL DAILY
Qty: 10 TABLET | Refills: 0 | Status: SHIPPED | OUTPATIENT
Start: 2023-12-05 | End: 2023-12-10

## 2023-12-05 RX ORDER — CEFDINIR 300 MG/1
300 CAPSULE ORAL 2 TIMES DAILY
Qty: 20 CAPSULE | Refills: 0 | Status: SHIPPED | OUTPATIENT
Start: 2023-12-05 | End: 2023-12-15

## 2023-12-24 DIAGNOSIS — R60.9 EDEMA, UNSPECIFIED TYPE: ICD-10-CM

## 2023-12-27 RX ORDER — FUROSEMIDE 20 MG/1
20 TABLET ORAL DAILY
Qty: 90 TABLET | Refills: 0 | Status: SHIPPED | OUTPATIENT
Start: 2023-12-27

## 2024-10-08 ENCOUNTER — APPOINTMENT (OUTPATIENT)
Dept: GENERAL RADIOLOGY | Age: 54
End: 2024-10-08
Attending: PHYSICIAN ASSISTANT
Payer: COMMERCIAL

## 2024-10-08 ENCOUNTER — HOSPITAL ENCOUNTER (OUTPATIENT)
Age: 54
Discharge: HOME | End: 2024-10-08
Payer: COMMERCIAL

## 2024-10-08 VITALS
HEART RATE: 76 BPM | OXYGEN SATURATION: 98 % | SYSTOLIC BLOOD PRESSURE: 134 MMHG | DIASTOLIC BLOOD PRESSURE: 70 MMHG | RESPIRATION RATE: 18 BRPM | TEMPERATURE: 98 F

## 2024-10-08 DIAGNOSIS — R07.81 RIB PAIN ON RIGHT SIDE: Primary | ICD-10-CM

## 2024-10-08 PROCEDURE — 71101 X-RAY EXAM UNILAT RIBS/CHEST: CPT | Performed by: PHYSICIAN ASSISTANT

## 2024-10-08 PROCEDURE — 99213 OFFICE O/P EST LOW 20 MIN: CPT | Performed by: PHYSICIAN ASSISTANT

## 2024-10-08 RX ORDER — DICYCLOMINE HCL 20 MG
20 TABLET ORAL EVERY 8 HOURS PRN
Qty: 10 TABLET | Refills: 0 | Status: SHIPPED | OUTPATIENT
Start: 2024-10-08 | End: 2024-10-15

## 2024-10-08 RX ORDER — LIDOCAINE 50 MG/G
1 PATCH TOPICAL EVERY 24 HOURS
Qty: 14 EACH | Refills: 0 | Status: SHIPPED | OUTPATIENT
Start: 2024-10-08 | End: 2024-10-22

## 2024-10-08 RX ORDER — CYCLOBENZAPRINE HCL 10 MG
10 TABLET ORAL NIGHTLY PRN
Qty: 10 TABLET | Refills: 0 | Status: SHIPPED | OUTPATIENT
Start: 2024-10-08 | End: 2024-10-18

## 2024-10-08 NOTE — ED PROVIDER NOTES
Chief Complaint   Patient presents with    Pain       HPI:     Jaci Mcdonald is a 53 year old female who denies any significant medical history presents to immediate care for evaluation of right lower rib pain since last Thursday.  Patient states she was moving a heavy planter when she felt a pop in her right ribs and has been having pain since.  Pain is increased with movement, reaching, twisting, deep breathing.  No shortness of breath or difficulty breathing.  Patient states this is the third time something like this has happened this year with her ribs, although it was on the left side previously.  Has been taking Tylenol and Motrin with some improvement.      PFSH    [facility] asessment screens reviewed  Nurses notes reviewed    History reviewed. No pertinent family history.    Social History     Socioeconomic History    Marital status:      Spouse name: Not on file    Number of children: Not on file    Years of education: Not on file    Highest education level: Not on file   Occupational History    Not on file   Tobacco Use    Smoking status: Never    Smokeless tobacco: Never   Substance and Sexual Activity    Alcohol use: Yes     Alcohol/week: 1.0 standard drink of alcohol     Types: 1 Shots of liquor per week    Drug use: No    Sexual activity: Not on file   Other Topics Concern    Caffeine Concern Not Asked    Exercise Not Asked    Seat Belt Not Asked    Special Diet Not Asked    Stress Concern Not Asked    Weight Concern Not Asked   Social History Narrative    Not on file     Social Determinants of Health     Financial Resource Strain: Not on file   Food Insecurity: Not on file   Transportation Needs: Not on file   Physical Activity: Not on file   Stress: Not on file   Social Connections: Unknown (3/13/2021)    [facility]    Social Connections     Conversations with friends/family/neighbors per week: Not on file   Housing Stability: Low  Risk  (7/8/2021)    [facility] from [facility], [facility]    Housing Stability     Mortgage Payment Concerns?: Not on file     Number of Places Lived in the Last Year: Not on file     Unstable Housing?: Not on file         ROS:   Positive for stated complaint  All other systems reviewed and negative except as noted above.  Constitutional and Vital Signs Reviewed.      Physical Exam:     Findings:    /70   Pulse 76   Temp 98.1 °F (36.7 °C) (Temporal)   Resp 18   SpO2 98%   GENERAL: alert, normal appearance, no distress.                 HEAD: Normocephalic, atraumatic.     EYES: Conjunctiva without injection, EOMs intact.                                 NECK: supple.             CARDIO: Regular rate and rhythm              LUNGS: No respiratory distress, nonlabored respirations.  Lungs clear bilaterally, no wheeze, rhonchi, or rales   Chest wall:  Right lateral lower rib tenderness to palpation without palpable deformity.  No ecchymosis or erythema.           MSK: Normal rom.     NEURO: Alert and oriented.       MDM/Assessment/Plan:   Orders for this encounter:    Orders Placed This Encounter    XR RIBS WITH CHEST (3 VIEWS), RIGHT  (CPT=71101)     Order Specific Question:   What is the Relevant Clinical Indication / Reason for Exam?     Answer:   lower right rib pain after moving planter last Thursday and feeling pop     Order Specific Question:   Release to patient     Answer:   Immediate    lidocaine 5 % External Patch     Sig: Place 1 patch onto the skin daily for 14 days. Apply patch and leave on for 12 hours and then remove.  Only use 1 patch per day.     Dispense:  14 each     Refill:  0    cyclobenzaprine 10 MG Oral Tab     Sig: Take 1 tablet (10 mg total) by mouth nightly as needed for Muscle spasms.     Dispense:  10 tablet     Refill:  0    dicyclomine 20 MG Oral Tab     Sig: Take 1 tablet (20 mg total) by mouth every 8 (eight) hours as needed.     Dispense:   10 tablet     Refill:  0     XR RIBS WITH CHEST (3 VIEWS), RIGHT  (CPT=71101)          PROCEDURE:  XR RIBS WITH CHEST (3 VIEWS), RIGHT  (CPT=71101)     TECHNIQUE:  PA Chest and three views of the ribs were obtained     COMPARISON:  [facility], XR, XR RIBS WITH CHEST (3 VIEWS), RIGHT  (CPT=71101), 8/12/2019, 9:32 AM.     INDICATIONS:  lower right rib pain after moving planter last Thursday and feeling pop     PATIENT STATED HISTORY: (As transcribed by Technologist)  The patient states she was lifting something 5 days ago and felt a \"pop\" in her right anterior lower ribs. She is having continued right, anterior rib pain.         FINDINGS:    LUNGS:  No significant pulmonary parenchymal abnormalities and normal vascularity.  CARDIAC:  Normal size cardiac silhouette.  MEDIASTINUM:  Normal.  PLEURA:  Normal.  BONES:  Normal for age.  No rib fracture or lesion.  SOFT TISSUES:  Negative.                   =====  CONCLUSION:  No acute disease.          LOCATION:  [place]              Dictated by (CST): Marino Hughes MD on 10/08/2024 at 7:28 PM       Finalized by (CST): Marino Hughes MD on 10/08/2024 at 7:30 PM                Labs performed this visit:  No results found for this or any previous visit (from the past 10 hour(s)).    MDM:  53-year-old female here for right chest wall pain, reproducible with movement and on palpation, musculoskeletal etiology.  Chest x-ray with ribs negative, results discussed with patient.  Likely chest wall strain versus costochondritis.  Patient given prescription for Lidoderm patch, she is having difficulty sleeping at night so we will also offer trial of Flexeril with drowsiness precautions to see if this will help.  Continue Tylenol and anti-inflammatory.  Patient states she is going out of town for her son's wedding and ran out of her dicyclomine that she is typically prescribed and wondering if I would be able to write her a refill.  Will give her #10 and advised that  she will have to get this refilled when she returns with her primary care physician.  Follow-up with primary care in 2 to 3 days or upon return and present to ER for new or worsening symptoms.  Patient agreeable to treatment plan and follow-up, all questions answered prior to discharge.      Diagnosis:    ICD-10-CM    1. Rib pain on right side  R07.81 XR RIBS WITH CHEST (3 VIEWS), RIGHT  (CPT=71101)     XR RIBS WITH CHEST (3 VIEWS), RIGHT  (CPT=71101)          All results reviewed and discussed with patient.  See AVS for detailed discharge instructions for your condition today.    Follow Up with:  Aidan Arriola MD  [address] [place] [address]  [phone]      Follow-up with primary care in 2 to 3 days and present to ER for new or worsening symptoms              NOTE TO PATIENT:  The 21st Century Cures Act makes medical notes like these available to patients in the interest of transparency. However, be advised this is a medical document. It is intended as peer to peer communication. It is written in medical language and may contain abbreviations or verbiage that are unfamiliar. It may appear blunt or direct. Medical documents are intended to carry relevant information, facts as evident, and the clinical opinion of the practitioner. .

## 2024-11-10 ENCOUNTER — HOSPITAL ENCOUNTER (OUTPATIENT)
Age: 54
Discharge: HOME | End: 2024-11-10
Payer: COMMERCIAL

## 2024-11-10 VITALS
DIASTOLIC BLOOD PRESSURE: 83 MMHG | RESPIRATION RATE: 20 BRPM | TEMPERATURE: 98 F | SYSTOLIC BLOOD PRESSURE: 144 MMHG | HEART RATE: 80 BPM | OXYGEN SATURATION: 98 %

## 2024-11-10 DIAGNOSIS — R05.1 ACUTE COUGH: ICD-10-CM

## 2024-11-10 DIAGNOSIS — J01.00 ACUTE NON-RECURRENT MAXILLARY SINUSITIS: Primary | ICD-10-CM

## 2024-11-10 LAB — S PYO AG THROAT QL: NEGATIVE

## 2024-11-10 RX ORDER — BENZONATATE 100 MG/1
100 CAPSULE ORAL 3 TIMES DAILY PRN
Qty: 30 CAPSULE | Refills: 0 | Status: SHIPPED | OUTPATIENT
Start: 2024-11-10 | End: 2024-12-10

## 2024-11-10 RX ORDER — AMOXICILLIN 875 MG/1
875 TABLET, COATED ORAL 2 TIMES DAILY
Qty: 14 TABLET | Refills: 0 | Status: SHIPPED | OUTPATIENT
Start: 2024-11-10 | End: 2024-11-17

## 2024-11-10 RX ORDER — IBUPROFEN 600 MG/1
600 TABLET, FILM COATED ORAL ONCE
Status: COMPLETED | OUTPATIENT
Start: 2024-11-10 | End: 2024-11-10

## 2024-11-10 NOTE — DISCHARGE INSTRUCTIONS
We recommend the following for your condition:    Amoxicillin: take 1 tab twice a day for 7 days.   Claritin D once a day  Flonase 2 sprays to each side of nose once a day - con't for 2-4 weeks  Nasal saline - either spray (\"Ocean\" brand) or [facility] Med Sinus Rinse 3 times a day  Rest and push fluids  Ibuprofen with food for sore throat.   Tessalon: may take 1 tab up to three times a day for coughing IF NEEDED.   Hot lemon tea with honey  Steam twice a day (either in shower or with cup of hot water and a towel over your head)   Keep water/liquids available when coughing.     Follow up if not improving in the next week.

## 2024-11-10 NOTE — ED PROVIDER NOTES
Patient Seen in: [facility]      History     Chief Complaint   Patient presents with    Cough/URI    Sore Throat     Stated Complaint: sore throat coughing runny nose    Subjective:   53-year-old female presents with complaint of cough, sore throat, nasal congestion, sinus pressure over the cheeks, and ear pain worse on the left that began over 1 week ago.  OTCs include Tylenol cold and sinus.  Patient states she is prone to sinus infections.    Patient denies fevers, chills, inability to swallow, shortness of breath, nausea, vomiting, diarrhea    The history is provided by the patient.             Objective:     Past Medical History:    Allergic rhinitis    Hyperthyroidism    Obesity              Past Surgical History:   Procedure Laterality Date          Hysterectomy      Removal of ovarian cyst(s)      Removal of ovary(s)                  No pertinent social history.            Review of Systems   Constitutional:  Negative for chills and fever.   HENT:  Positive for congestion, sinus pressure, sinus pain and sore throat. Negative for trouble swallowing.    Respiratory:  Positive for cough.    Gastrointestinal:  Negative for constipation, nausea and vomiting.       Positive for stated complaint: sore throat coughing runny nose  Other systems are as noted in HPI.  Constitutional and vital signs reviewed.      All other systems reviewed and negative except as noted above.    Physical Exam     ED Triage Vitals [11/10/24 0948]   /83   Pulse 80   Resp 20   Temp 97.8 °F (36.6 °C)   Temp src Temporal   SpO2 98 %   O2 Device None (Room air)       Current Vitals:   Vital Signs  BP: 144/83  Pulse: 80  Resp: 20  Temp: 97.8 °F (36.6 °C)  Temp src: Temporal    Oxygen Therapy  SpO2: 98 %  O2 Device: None (Room air)        Physical Exam  Vitals and nursing note reviewed.   Constitutional:       General: She is not in acute distress.     Appearance: Normal appearance. She is well-developed. She  is not ill-appearing, toxic-appearing or diaphoretic.   HENT:      Head: Normocephalic.      Right Ear: Tympanic membrane normal. Tympanic membrane is not erythematous or bulging.      Left Ear: Tympanic membrane normal. Tympanic membrane is not erythematous or bulging.      Nose: Congestion present.      Right Sinus: Maxillary sinus tenderness present. No frontal sinus tenderness.      Left Sinus: Maxillary sinus tenderness present. No frontal sinus tenderness.      Mouth/Throat:      Mouth: Mucous membranes are moist.      Pharynx: Posterior oropharyngeal erythema (mild) present.   Eyes:      Conjunctiva/sclera: Conjunctivae normal.   Cardiovascular:      Rate and Rhythm: Normal rate and regular rhythm.      Heart sounds: No murmur heard.  Pulmonary:      Effort: Pulmonary effort is normal.      Breath sounds: Normal breath sounds. No wheezing, rhonchi or rales.   Lymphadenopathy:      Cervical: No cervical adenopathy.   Skin:     General: Skin is warm and dry.   Neurological:      Mental Status: She is alert.   Psychiatric:         Mood and Affect: Mood normal.             ED Course     Labs Reviewed   POCT RAPID STREP - Normal        MDM      Medical Decision Making  53-year-old female presents with complaint of cough, sore throat, nasal congestion, sinus pressure over the cheeks, and ear pain worse on the left that began over 1 week ago.  Differential diagnoses include strep versus sinusitis versus viral URI with cough.  Rapid strep negative. Declined Flu/Covid testing.   On exam patient is well-appearing with normal vitals.  Lungs are clear bilaterally.  Maxillary sinus pressure present.  Will treat for sinusitis with amoxicillin, Flonase, nasal saline, Tessalon, push fluids, steam.  Follow-up with PCP if not improving.  Patient agreeable to plan.      Amount and/or Complexity of Data Reviewed  External Data Reviewed: notes.  Labs: ordered.    Risk  OTC drugs.  Prescription drug management.        Disposition  and Plan     Clinical Impression:  1. Acute non-recurrent maxillary sinusitis    2. Acute cough         Disposition:  Discharge  11/10/2024 10:23 am    Follow-up:  Aidan Arriola MD  [address] [place] [address]  [phone]    Schedule an appointment as soon as possible for a visit in 1 week  If symptoms worsen          Medications Prescribed:  Discharge Medication List as of 11/10/2024 10:28 AM        START taking these medications    Details   benzonatate 100 MG Oral Cap Take 1 capsule (100 mg total) by mouth 3 (three) times daily as needed for cough., Normal, Disp-30 capsule, R-0                 Supplementary Documentation:

## 2025-06-14 ENCOUNTER — HOSPITAL ENCOUNTER (OUTPATIENT)
Age: 55
Discharge: HOME | End: 2025-06-14

## 2025-06-14 ENCOUNTER — APPOINTMENT (OUTPATIENT)
Dept: GENERAL RADIOLOGY | Age: 55
End: 2025-06-14
Attending: PHYSICIAN ASSISTANT

## 2025-06-14 VITALS
SYSTOLIC BLOOD PRESSURE: 132 MMHG | OXYGEN SATURATION: 96 % | DIASTOLIC BLOOD PRESSURE: 91 MMHG | RESPIRATION RATE: 18 BRPM | HEART RATE: 87 BPM | TEMPERATURE: 98 F

## 2025-06-14 DIAGNOSIS — S89.90XA KNEE INJURY: ICD-10-CM

## 2025-06-14 DIAGNOSIS — S83.92XA SPRAIN OF LEFT KNEE, UNSPECIFIED LIGAMENT, INITIAL ENCOUNTER: Primary | ICD-10-CM

## 2025-06-14 PROCEDURE — 73560 X-RAY EXAM OF KNEE 1 OR 2: CPT | Performed by: PHYSICIAN ASSISTANT

## 2025-06-14 PROCEDURE — 99213 OFFICE O/P EST LOW 20 MIN: CPT | Performed by: PHYSICIAN ASSISTANT

## 2025-06-14 PROCEDURE — A6449 LT COMPRES BAND >=3" <5"/YD: HCPCS | Performed by: PHYSICIAN ASSISTANT

## 2025-06-14 PROCEDURE — E0114 CRUTCH UNDERARM PAIR NO WOOD: HCPCS | Performed by: PHYSICIAN ASSISTANT

## 2025-06-14 RX ORDER — IBUPROFEN 600 MG/1
600 TABLET, FILM COATED ORAL ONCE
Status: COMPLETED | OUTPATIENT
Start: 2025-06-14 | End: 2025-06-14

## 2025-06-14 NOTE — ED INITIAL ASSESSMENT (HPI)
Patient was at [facility] and was lightly jogging with her grand-daughter and felt a pop and sudden paint to her left knee. She did tweak her knee a couple weeks ago, but it was a sudden pain that quickly went away. This pain today is worse and shooting up and down her lateral leg. She is able to bend the knee, but weight bearing is very painful.

## 2025-06-14 NOTE — ED PROVIDER NOTES
Patient Seen in: [facility]        History  Chief Complaint   Patient presents with    Leg or Foot Injury     Stated Complaint: L KNEE INJURY    Subjective:   The history is provided by the patient.               54-year-old female with past medical history of hypothyroidism obesity presents to immediate care due to left knee pain.  Patient was running on uneven gravel when she felt a popping sensation of the left knee.  No direct injury or trauma but since then continues to notice pain of the lateral aspect.  Able to bend although painful.  Bearing some weight with a limp.  No peripheral tingling or numbness.  Had similar pain a few weeks prior and had some residual soreness however that pain had improved until today.  No medications attempted at home      Objective:     Past Medical History:    Allergic rhinitis    Hyperthyroidism    Obesity              Past Surgical History:   Procedure Laterality Date          Hysterectomy      Removal of ovarian cyst(s)      Removal of ovary(s)                  Social History     Socioeconomic History    Marital status:    Tobacco Use    Smoking status: Never    Smokeless tobacco: Never   Substance and Sexual Activity    Alcohol use: Yes     Alcohol/week: 1.0 standard drink of alcohol     Types: 1 Shots of liquor per week    Drug use: No     Social Drivers of Health     Food Insecurity: No Food Insecurity (2024)    Received from [facility]    Food Insecurity     Currently or in the past 3 months, have you worried your food would run out before you had money to buy more?: No     In the past 12 months, have you run out of food or been unable to get more?: No   Transportation Needs: No Transportation Needs (2024)    Received from [facility]    Transportation Needs     Currently or in the past 3 months, has lack of transportation kept you from medical appointments, getting food or medicine, or  providing care to a family member?: No     Medical Transportation Needs?: No    Received from [facility]    Housing Stability              Review of Systems   Respiratory: Negative.     Cardiovascular: Negative.    Musculoskeletal:  Positive for gait problem and joint swelling.       Positive for stated complaint: L KNEE INJURY  Other systems are as noted in HPI.  Constitutional and vital signs reviewed.      All other systems reviewed and negative except as noted above.                  Physical Exam    ED Triage Vitals [06/14/25 1411]   BP (!) 132/91   Pulse 87   Resp 18   Temp 98.4 °F (36.9 °C)   Temp src Oral   SpO2 96 %   O2 Device None (Room air)       Current Vitals:   Vital Signs  BP: (!) 132/91  Pulse: 87  Resp: 18  Temp: 98.4 °F (36.9 °C)  Temp src: Oral    Oxygen Therapy  SpO2: 96 %  O2 Device: None (Room air)            Physical Exam  Vitals and nursing note reviewed.   Constitutional:       General: She is not in acute distress.     Appearance: Normal appearance.   Pulmonary:      Effort: Pulmonary effort is normal.   Musculoskeletal:      Comments: Knee without effusion erythema or ecchymosis.  Tenderness over the patellar tendon.  Tenderness over the lateral joint line. Full ROM.  Compartments are soft.  CMS is intact.  Ambulating with a slight limp   Skin:     General: Skin is warm.   Neurological:      General: No focal deficit present.      Mental Status: She is alert and oriented to person, place, and time.   Psychiatric:         Mood and Affect: Mood normal.         Behavior: Behavior normal.                 ED Course  Labs Reviewed - No data to display       XR KNEE (1 OR 2 VIEWS), LEFT (CPT=73560)  Result Date: 6/14/2025  PROCEDURE:  XR KNEE (1 OR 2 VIEWS), LEFT (CPT=73560)  COMPARISON:  None.  INDICATIONS:  L KNEE INJURY  PATIENT STATED HISTORY: (As transcribed by Technologist)  Patient states she was running on uneven gravel and felt a pop to left knee. Pain to left knee.  Left knee feels unstable.    FINDINGS:  Negative for fracture, dislocation, deformity or other acute bony abnormality. Osteoarthritic changes are noted, mild degree.  No soft tissue abnormalities.             CONCLUSION:  Mild osteoarthritic changes are present. No acute fracture or other acute bony process.   LOCATION:  [facility]   Dictated by (CST): Steve Collier MD on 6/14/2025 at 2:34 PM     Finalized by (CST): Steve Collier MD on 6/14/2025 at 2:34 PM                         MDM  Ddx -knee sprain, left knee fracture, left knee dislocation, ligamental injury    On exam the patient is in no acute distress ambulating with a slight limp.  No erythema edema or deformity.  Reproducible tenderness joint line.  Full range of motion.  CMS is intact.  X-ray confirms no fracture.  Clinical exam is consistent with ligamental injury or sprain.  Discussed conservative treatment Ace wrap applied orthopedic follow-up given        Medical Decision Making  Problems Addressed:  Knee injury: acute illness or injury  Sprain of left knee, unspecified ligament, initial encounter: acute illness or injury    Amount and/or Complexity of Data Reviewed  Radiology: ordered and independent interpretation performed. Decision-making details documented in ED Course.    Risk  OTC drugs.        Disposition and Plan     Clinical Impression:  1. Sprain of left knee, unspecified ligament, initial encounter    2. Knee injury         Disposition:  Discharge  6/14/2025  2:50 pm    Follow-up:  Jose Larkin PA-C  [address] [place] [id number]  [phone]                Medications Prescribed:  Current Discharge Medication List                Supplementary Documentation:

## (undated) NOTE — MR AVS SNAPSHOT
[address] [phone]               Thank you for choosing us for your health care visit with Fracisco Collado MD.  We are glad to serve you and happy to provide you with this summary of Take 1 tablet (37.5 mg total) by mouth every morning before breakfast.   What changed:  Another medication with the same name was added. Make sure you understand how and when to take each.    Commonly known as:  ADIPEX-P           * Phentermine HCl 15 MG Ca

## (undated) NOTE — LETTER
[address], [facility], [address] [facility]  8 [facility] 6 [facility] [address]  Dept: Celeste 91,  1970, is a patient of mine.  She was ecnoruaged to undergo food se

## (undated) NOTE — MR AVS SNAPSHOT
[address] [phone]               Thank you for choosing us for your health care visit with Sierra Ruiz MD.  We are glad to serve you and happy to provide you with this summary of Take 1 tablet by mouth daily. Naltrexone-Bupropion HCl ER 8-90 MG Tb12   Take 2 tablets by mouth 2 (two) times daily.    Commonly known as:  CONTRAVE           Phentermine HCl 37.5 MG Tabs   Take 1 tablet (37.5 mg total) by mouth every morning bef

## (undated) NOTE — LETTER
7/30/2019    Dear Dr. Zohra Ferreira ref. provider found,     Thank you for your referral and continued support of the [facility]. I met with Bing Low on 7/30/2019.  Below is a brief overview of the visit and the current nutrition plan we have Lunch Salads, cabbages, kale, spinach, shaved brussel sprouts, sunflower seeds, pepitas- organic dressing   Snack Pork rins, cranberries   Dinner 9pm grassfed ground beef, lean ground turkey, veggies   Sleep 12:30am     Supplements  b-complex  probioitcs

## (undated) NOTE — MR AVS SNAPSHOT
[address] [phone]               Thank you for choosing us for your health care visit with Lynda Longo MD.  We are glad to serve you and happy to provide you with this summary of Return in about 4 weeks (around 7/21/2017). Reason for Today's Visit     Weight Check           Medical Issues Discussed Today     Encounter for therapeutic drug monitoring    Obesity (BMI 30-39. 9)    Stress      Instructions and Information about discharge instructions in [employer] by going to Visits < Admission Summaries. If you've been to the Emergency Department or your doctor's office, you can view your past visit information in [facility] by going to Visits < Visit Summaries. Copybar questions?

## (undated) NOTE — LETTER
07/24/17        [address]      Dear Cris Earl records indicate that you have outstanding lab work and or testing that was ordered for you and has not yet been completed:          Vitamin B12      TSH

## (undated) NOTE — MR AVS SNAPSHOT
[address] [phone]               Thank you for choosing us for your health care visit with Sierra Ruiz MD.  We are glad to serve you and happy to provide you with this summary of view more details from this visit by going to [email or web address]. If you've recently had a stay at the Hospital you can access your discharge instructions in [facility] by going to Visits < Admission Summaries.  If you've been to the Emergency Depar priority on exercise in your life                    Visit [facility] online at  [email or web address]

## (undated) NOTE — LETTER
[address], [facility], [address] [facility]  [address] [address]  Dept: Celeste 91,  1970, was encouraged to obtain 23 and Me testing due to her many

## (undated) NOTE — Clinical Note
Evangelist,I saw Karne Lamar in clinic today for weight loss/management. I have recommended intensive lifestyle/behavioral modifications for weight loss and follow up with you both as recommended.  In addition, I have recommended she restart contrave, she is having gr